# Patient Record
Sex: MALE | Race: WHITE | NOT HISPANIC OR LATINO
[De-identification: names, ages, dates, MRNs, and addresses within clinical notes are randomized per-mention and may not be internally consistent; named-entity substitution may affect disease eponyms.]

---

## 2017-01-09 ENCOUNTER — OTHER (OUTPATIENT)
Age: 67
End: 2017-01-09

## 2017-08-03 ENCOUNTER — OTHER (OUTPATIENT)
Age: 67
End: 2017-08-03

## 2018-04-18 ENCOUNTER — OTHER (OUTPATIENT)
Age: 68
End: 2018-04-18

## 2019-04-01 ENCOUNTER — OTHER (OUTPATIENT)
Age: 69
End: 2019-04-01

## 2019-04-17 ENCOUNTER — OTHER (OUTPATIENT)
Age: 69
End: 2019-04-17

## 2019-05-22 ENCOUNTER — APPOINTMENT (OUTPATIENT)
Dept: NEUROSURGERY | Facility: CLINIC | Age: 69
End: 2019-05-22
Payer: MEDICARE

## 2019-05-22 PROCEDURE — 99205 OFFICE O/P NEW HI 60 MIN: CPT

## 2019-05-24 NOTE — HISTORY OF PRESENT ILLNESS
[de-identified] : 69 yo male presents to the office for a follow up with c/o of imbalance which has been insidious over the past few months. He has \par Pt has a history of two bouts with Amnesia and was hospitalized at [facility] in 2015. At that time, the meningioma was first seen. We have been managing this lesion conservatively.\par Denies dizziness, headaches, vision, speech disturbance. He does state he does not drink water and drinks coffee all day. \par In April 2019, fu mri w wo showed the mass to be unchanged as compared to MRI in 2015.\par \par Plan: Dr. Guillermo evaluation May 28th at 530 pm\par Hematology for elevated Hematocrit\par PCP for possible dehydration

## 2019-05-24 NOTE — ASSESSMENT
[FreeTextEntry1] : May 22, 2019\par \par \par \par Re:	Gustavo Whitehead \par :	1950\par \par The patient is now a 68-year-old male who was seen by myself almost 4 years ago.  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  He was admitted to [facility].  Holter shows a questionable arrhythmia and he was diagnosed with transient global amnesia.  An MRI showed a small right CP angle meningioma and eventually he came to see me for evaluation of the meningioma, and I felt that it should be monitored completely conservatively.  \par \par He has a past medical history positive for BPH and hypercholesterolemia.  \par \par The patient has had follow-up imaging in a serial fashion every year, and there has been no change in the tumor.  His last scan was performed on May 7, 2019, and it shows no significant interval change.  I reviewed these images and once again there is a tumor that appears to be either a meningioma or a vestibular schwannoma that has some extension into the internal auditory meatus with some mild compression of the adjacent cerebellum and brainstem.  It is unchanged.  \par \par The patient remains completely neurologically intact.  He has had 3-5 months of slightly decreased balance without dizziness, and because of this he has returned to me and asked if this could be from the tumor.  Given the fact that the tumor is unchanged in size, I would doubt it was from the tumor, but we cannot completely rule it out.  I still would advocate a conservative approach for this tumor, as it has been completely stable morphologically.  I have taken the liberty of sending Mr. Whitehead to Dr. Guillermo for a full neurologic evaluation.  Of note, he has hesitancy hydrating secondary to his prostate and leaking issues.  His hematocrit is 52 and he is frequently dehydrated.  This may bear on his balance issues as well.  \par \par I do not think there is any indication for neurosurgical intervention.  I explained this clearly, and I will see the patient in one year with follow-up imaging.\par \par \par \par Jarrod Chung M.D., F.A.C.S.\par [facility]\par \par

## 2019-05-24 NOTE — PHYSICAL EXAM
[General Appearance - Alert] : alert [General Appearance - In No Acute Distress] : in no acute distress [Oriented To Time, Place, And Person] : oriented to person, place, and time [Impaired Insight] : insight and judgment were intact [Affect] : the affect was normal [Person] : oriented to person [Place] : oriented to place [Time] : oriented to time [Short Term Intact] : short term memory intact [Remote Intact] : remote memory intact [Span Intact] : the attention span was normal [Concentration Intact] : normal concentrating ability [Fluency] : fluency intact [Comprehension] : comprehension intact [Current Events] : adequate knowledge of current events [Past History] : adequate knowledge of personal past history [Vocabulary] : adequate range of vocabulary [Cranial Nerves Oculomotor (III)] : extraocular motion intact [Cranial Nerves Trigeminal (V)] : facial sensation intact symmetrically [Cranial Nerves Facial (VII)] : face symmetrical [Cranial Nerves Vestibulocochlear (VIII)] : hearing was intact bilaterally [Cranial Nerves Glossopharyngeal (IX)] : tongue and palate midline [Cranial Nerves Accessory (XI - Cranial And Spinal)] : head turning and shoulder shrug symmetric [Cranial Nerves Hypoglossal (XII)] : there was no tongue deviation with protrusion [Motor Tone] : muscle tone was normal in all four extremities [Motor Strength] : muscle strength was normal in all four extremities [No Muscle Atrophy] : normal bulk in all four extremities [Motor Handedness Right-Handed] : the patient is right hand dominant [Sensation Tactile Decrease] : light touch was intact [Balance] : balance was intact [Extraocular Movements] : extraocular movements were intact [Outer Ear] : the ears and nose were normal in appearance [Neck Appearance] : the appearance of the neck was normal [] : no respiratory distress [Respiration, Rhythm And Depth] : normal respiratory rhythm and effort [Exaggerated Use Of Accessory Muscles For Inspiration] : no accessory muscle use [Heart Rate And Rhythm] : heart rate was normal and rhythm regular [Abnormal Walk] : normal gait [Involuntary Movements] : no involuntary movements were seen [Skin Color & Pigmentation] : normal skin color and pigmentation [Skin Turgor] : normal skin turgor [Past-pointing] : there was no past-pointing [Tremor] : no tremor present

## 2019-07-22 ENCOUNTER — APPOINTMENT (OUTPATIENT)
Dept: OTOLARYNGOLOGY | Facility: CLINIC | Age: 69
End: 2019-07-22
Payer: MEDICARE

## 2019-07-22 VITALS
HEART RATE: 71 BPM | WEIGHT: 148 LBS | DIASTOLIC BLOOD PRESSURE: 92 MMHG | BODY MASS INDEX: 23.23 KG/M2 | HEIGHT: 67 IN | OXYGEN SATURATION: 98 % | SYSTOLIC BLOOD PRESSURE: 145 MMHG

## 2019-07-22 PROCEDURE — 99203 OFFICE O/P NEW LOW 30 MIN: CPT

## 2019-07-22 NOTE — ASSESSMENT
[FreeTextEntry1] : right meningioma at CPA\par +instability and hearing loss\par growing on serial MRIs since 2016- follows with dr escudero of [facility]\par \par consult today with dr rodriguez and dr owusu\par d/w patient option of surgery vs radiotherapy\par patient to consult with radiotherapy- recommended by dr owusu given patients age \par \par if patient desires surgery in future can return to office to schedule as needed

## 2019-08-06 ENCOUNTER — APPOINTMENT (OUTPATIENT)
Dept: RADIATION ONCOLOGY | Facility: CLINIC | Age: 69
End: 2019-08-06
Payer: MEDICARE

## 2019-08-06 VITALS
OXYGEN SATURATION: 98 % | DIASTOLIC BLOOD PRESSURE: 74 MMHG | SYSTOLIC BLOOD PRESSURE: 118 MMHG | RESPIRATION RATE: 18 BRPM | BODY MASS INDEX: 23.86 KG/M2 | HEART RATE: 74 BPM | WEIGHT: 152.34 LBS

## 2019-08-06 DIAGNOSIS — Z87.891 PERSONAL HISTORY OF NICOTINE DEPENDENCE: ICD-10-CM

## 2019-08-06 DIAGNOSIS — Z87.438 PERSONAL HISTORY OF OTHER DISEASES OF MALE GENITAL ORGANS: ICD-10-CM

## 2019-08-06 DIAGNOSIS — Z82.49 FAMILY HISTORY OF ISCHEMIC HEART DISEASE AND OTHER DISEASES OF THE CIRCULATORY SYSTEM: ICD-10-CM

## 2019-08-06 DIAGNOSIS — Z86.2 PERSONAL HISTORY OF DISEASES OF THE BLOOD AND BLOOD-FORMING ORGANS AND CERTAIN DISORDERS INVOLVING THE IMMUNE MECHANISM: ICD-10-CM

## 2019-08-06 PROCEDURE — 99205 OFFICE O/P NEW HI 60 MIN: CPT | Mod: 25,GC

## 2019-08-06 RX ORDER — SERTRALINE HYDROCHLORIDE 50 MG/1
50 TABLET, FILM COATED ORAL
Refills: 0 | Status: ACTIVE | COMMUNITY
Start: 2019-08-06

## 2019-08-06 NOTE — VITALS
[Maximal Pain Intensity: 0/10] : 0/10 [Least Pain Intensity: 0/10] : 0/10 [90: Able to carry normal activity; minor signs or symptoms of disease.] : 90: Able to carry normal activity; minor signs or symptoms of disease.  [90: Minor restrictions in physically strenous activity.] : 90: Minor restrictions in physically strenuous activity. [Date: ____________] : Patient's last distress assessment performed on [unfilled]. [3 - Distress Level] : Distress Level: 3

## 2019-08-06 NOTE — PHYSICAL EXAM
[General Appearance - Well Developed] : well developed [Normal] : oriented to person, place and time, the affect was normal, the mood was normal and not anxious [Oriented To Time, Place, And Person] : oriented to person, place, and time

## 2019-08-07 NOTE — HISTORY OF PRESENT ILLNESS
[FreeTextEntry1] : Gustavo Arciniega  is a 67yo male with a 5 year history of a growth alone the cerebellopontine angle likely meningioma vs schwannoma who is being evaluated for definitive RT.  \par \par Oncologic history as follows:\par 	\par 2015: After two bouts with Amnesia was hospitalized for evaluation.  Was noted to have an incidentally seen CPA angle mass.  He was recommended surveillance.  \par 2016: 1.5x1.5x1.7 cm meningioma at R. CPA\par 2555-0188:  serial MRI monitoring with gradual increase in measurements per reports (images not in our system), being managed by Dr. Chung ([facility]uro[facility])\par 4/17/19: MRI shows it now measures 2.3x1.8x2.6. Dr. Chung advocated for conservative management considering he does not feel it has changed very much in diameter. \par 7/22: Dr. Haney (ENT) saw patient and referred to Rad Onc, noting that he detected hearing loss. This right hearing loss was verified by VNG done by audiologist. Dr. Shahid discussed the patient with neurosurgery (Dr. Michaels) who said that the patient would be a better candidate for radiation considering age. He comes to us for a second opinion on radiation. \par \par Today he denies headaches. He wears bilateral hearing aids, but has noticed his hearing in the right ear has progressively gotten worse and has been associated with increasing tinnitus.  He has a sensation of the ground being tilted.  This has been worked up as described above.

## 2019-08-07 NOTE — REVIEW OF SYSTEMS
[Loss of Hearing] : loss of hearing [Dizziness] : dizziness [Negative] : Psychiatric [Fever] : no fever [Chills] : no chills [Night Sweats] : no night sweats [Fatigue] : no fatigue [Shortness Of Breath] : no shortness of breath [Wheezing] : no wheezing [Cough] : no cough [Confused] : no confusion [Fainting] : no fainting [Difficulty Walking] : no difficulty walking [FreeTextEntry4] : right sided hearing loss, worse over last 4 weeks.  [de-identified] : balance worse starting 6 months ago, now improving. Right sided hearing loss, new 4 weeks ago.

## 2019-08-07 NOTE — DISEASE MANAGEMENT
[Clinical] : TNM Stage: c [N/A] : Currently not applicable [FreeTextEntry4] : CPA mass [TTNM] : X [NTNM] : X [MTNM] : X

## 2019-08-07 NOTE — DATA REVIEWED
[FreeTextEntry1] : I have personally reviewed the series of MRI's that are available to me today either on CD or in the PACS.  There is a 2.3 cm mass which appears much larger than that at time of diagnosis which now has a large interface with the brainstem.  I suspect that this is most likely a meningioma

## 2019-08-08 ENCOUNTER — APPOINTMENT (OUTPATIENT)
Dept: NEUROSURGERY | Facility: CLINIC | Age: 69
End: 2019-08-08
Payer: MEDICARE

## 2019-08-08 VITALS
TEMPERATURE: 98.2 F | RESPIRATION RATE: 18 BRPM | HEART RATE: 100 BPM | SYSTOLIC BLOOD PRESSURE: 109 MMHG | HEIGHT: 67 IN | OXYGEN SATURATION: 94 % | WEIGHT: 147 LBS | BODY MASS INDEX: 23.07 KG/M2 | DIASTOLIC BLOOD PRESSURE: 70 MMHG

## 2019-08-08 PROCEDURE — 99215 OFFICE O/P EST HI 40 MIN: CPT

## 2019-08-12 NOTE — ASSESSMENT
[FreeTextEntry1] : 2019\par \par \par \par Re:	Gustavo Whitehead \par :	1950\par \par I last saw the patient on May 22, 2019, and he had a small CP angle mass, rule out meningioma.  I felt at that point that he had been stable and the tumor had not grown significantly, and I advised conservative treatment at least for the first year.  Subsequently, he has had many opinions, both at [facility] and [facility] and from \[facility] Dr. Wheeler, and the feeling is weighted toward treatment.  \par \par I went back and I looked at his images again with the patient, and there has been a small growth of the tumor over 2 years, about 3 mm in each direction.  It is not unreasonable for the patient to proceed with radiation or surgical removal.  I agree with Dr. Kc’s recommendation, which was given while the patient saw Dr. Haney, that radiation would be an appropriate treatment.  I told him a conservative approach is still possible, but radiation is still an absolutely appropriate treatment for this lesion.  He has lost hearing in that ear, and I told him the hearing will not return, and he is weighing his options but will most probably proceed with radiation with Dr. Wheeler. \par \par \par \par Jarrod Chnug M.D., F.A.C.S.\[facility] \[facility]

## 2019-08-12 NOTE — HISTORY OF PRESENT ILLNESS
[FreeTextEntry1] : 69 yo male presents to the office for a neurosurgical consultation for meningioma along the CPA. This mass was initially noted in 2015 and we recommended to manage conservatively. \par 2016, the meningioma measured 1.5x1.5x1.7 cm. In 2017, lesion was stable. In 2018, lesion was noted and stable.\par We saw the pt in May 2019 and it was felt at that time, the lesion was unchanged in size and we opted to continue to manage it conservatively. He was neurologically intact.\par Pt consulted with Ag Bradley, [facility] neurosurgeon, Dr. Wheeler and personal friends who are physicians for opinion on right CPA lesion. \par Dr. Chung feels there is some growth and this lesion should be radiated. \par

## 2019-08-12 NOTE — PHYSICAL EXAM
[FreeTextEntry1] : Awake, alert, and oriented x 3. VSS. In no apparent distress.   Short and long term memory intact.  Speech is clear and appropriate.  Affect is normal.  Voice is strong.  Respirations easy and even.  Normal skin color and pigmentation.  The sclera and conjunctiva normal.  Ears, nose, and neck normal in appearance.  EOMI, no nystagmus, facial sensation intact symmetrically, face symmetrical, hearing loss on right, tongue and palate midline, head turning and shoulder shrug symmetric and no tongue deviation with protrusion.  No pronator drift.   No past-pointing, no tremors noted, no dysdiadochokinesia, and finger to nose dysmetria was not present.  Romberg negative.  \par Right  hand dominant.\par \par Rises from a seated position in a comfortable fashion.\par \par Gait is well coordinated and stable without the use of an assistive device.   Able to perform tandem walk without loss of balance.   Motor strength in the upper extremities 5/5 in the biceps, triceps, and hand .  Motor strength in the lower extremities is 5/5 in the iliopsoas, quadriceps, and hamstrings.  \par \par

## 2019-08-19 ENCOUNTER — OUTPATIENT (OUTPATIENT)
Dept: OUTPATIENT SERVICES | Facility: HOSPITAL | Age: 69
LOS: 1 days | Discharge: HOME | End: 2019-08-19
Payer: MEDICARE

## 2019-08-19 DIAGNOSIS — K08.409 PARTIAL LOSS OF TEETH, UNSPECIFIED CAUSE, UNSPECIFIED CLASS: Chronic | ICD-10-CM

## 2019-09-03 PROCEDURE — 77263 THER RADIOLOGY TX PLNG CPLX: CPT

## 2019-09-06 PROCEDURE — 77338 DESIGN MLC DEVICE FOR IMRT: CPT | Mod: 26

## 2019-09-06 PROCEDURE — 77300 RADIATION THERAPY DOSE PLAN: CPT | Mod: 26

## 2019-09-06 PROCEDURE — 77301 RADIOTHERAPY DOSE PLAN IMRT: CPT | Mod: 26

## 2019-09-13 PROCEDURE — 77427 RADIATION TX MANAGEMENT X5: CPT

## 2019-09-13 PROCEDURE — 77387B: CUSTOM | Mod: 26

## 2019-09-16 PROCEDURE — 77387B: CUSTOM | Mod: 26

## 2019-09-17 PROCEDURE — 77387B: CUSTOM | Mod: 26

## 2019-09-17 NOTE — PHYSICAL EXAM
[General Appearance - Well Developed] : well developed [Normal] : normal skin color and pigmentation and no rash [Oriented To Time, Place, And Person] : oriented to person, place, and time

## 2019-09-18 PROCEDURE — 77387B: CUSTOM | Mod: 26

## 2019-09-18 NOTE — REVIEW OF SYSTEMS
[Loss of Hearing] : loss of hearing [Dizziness] : dizziness [Negative] : Psychiatric [Fever] : no fever [Night Sweats] : no night sweats [Chills] : no chills [Fatigue] : no fatigue [Wheezing] : no wheezing [Shortness Of Breath] : no shortness of breath [Cough] : no cough [Confused] : no confusion [Fainting] : no fainting [Difficulty Walking] : no difficulty walking [FreeTextEntry4] : right sided hearing loss [de-identified] : balance now slightly worse. denies headache

## 2019-09-18 NOTE — HISTORY OF PRESENT ILLNESS
[FreeTextEntry1] : Gustavo Arciniega  is a 67yo male with a 5 year history of a growth alone the cerebellopontine angle likely meningioma vs schwannoma who presented initially for evaluation for definitive RT on 8/6/19.\par \par RELEVANT MEDICAL HISTORY\par 	\par 2015: After two bouts with Amnesia was hospitalized for evaluation.  Was noted to have an incidentally seen CPA angle mass.  He was recommended surveillance.  \par 2016: 1.5x1.5x1.7 cm meningioma at R. CPA\par [id number]:  serial MRI monitoring with gradual increase in measurements per reports (images not in our system), being managed by Dr. Chung ([facility]uro[facility])\par 4/17/19: MRI shows it now measures 2.3x1.8x2.6. Dr. Chung advocated for conservative management considering he does not feel it has changed very much in diameter. \par 7/22: Dr. Haney (ENT) saw patient and referred to Rad Onc, noting that he detected hearing loss. This right hearing loss was verified by VNG done by audiologist. Dr. Shahid discussed the patient with neurosurgery (Dr. Michaels) who said that the patient would be a better candidate for radiation considering age. He comes to us for a second opinion on radiation. \par \par At the time of initial consult he denies headaches. He wears bilateral hearing aids, but has noticed his hearing in the right ear has progressively gotten worse and has been associated with increasing tinnitus.  He has a sensation of the ground being tilted.  This has been worked up as described above. \par \par 9/18/19- Mr. Whitehead presents today for on treatment visit. He has completed 540/5040 cgy of radiation to a right CP angle meningioma.\par Today he is feeling well. Notes slight decrease in balance lately. Tinnitus is maybe slightly worse. Denies headache, nausea, vomiting.

## 2019-09-18 NOTE — DISEASE MANAGEMENT
[Clinical] : TNM Stage: c [N/A] : Currently not applicable [FreeTextEntry4] : CPA mass [NTNM] : X [TTNM] : X [MTNM] : X [de-identified] : 5040 cgy [de-identified] : 540 cgy [de-identified] : right CP angle meningioma

## 2019-09-19 PROCEDURE — 77014: CPT | Mod: 26

## 2019-09-20 PROCEDURE — 77387B: CUSTOM | Mod: 26

## 2019-09-20 PROCEDURE — 77427 RADIATION TX MANAGEMENT X5: CPT

## 2019-09-23 VITALS
HEART RATE: 65 BPM | SYSTOLIC BLOOD PRESSURE: 149 MMHG | OXYGEN SATURATION: 97 % | WEIGHT: 146.38 LBS | BODY MASS INDEX: 22.93 KG/M2 | DIASTOLIC BLOOD PRESSURE: 78 MMHG | RESPIRATION RATE: 16 BRPM | TEMPERATURE: 98.2 F

## 2019-09-23 PROCEDURE — 77387B: CUSTOM | Mod: 26

## 2019-09-23 NOTE — HISTORY OF PRESENT ILLNESS
[FreeTextEntry1] : Gustavo Arciniega  is a 69yo male with a 5 year history of a growth alone the cerebellopontine angle likely meningioma vs schwannoma who presented initially for evaluation for definitive RT on 8/6/19.\par \par RELEVANT MEDICAL HISTORY\par 	\par 2015: After two bouts with Amnesia was hospitalized for evaluation.  Was noted to have an incidentally seen CPA angle mass.  He was recommended surveillance.  \par 2016: 1.5x1.5x1.7 cm meningioma at R. CPA\par [id number]:  serial MRI monitoring with gradual increase in measurements per reports (images not in our system), being managed by Dr. Chung (Neuro[facility]x)\par 4/17/19: MRI shows it now measures 2.3x1.8x2.6. Dr. Chung advocated for conservative management considering he does not feel it has changed very much in diameter. \par 7/22: Dr. Haney (ENT) saw patient and referred to Rad Onc, noting that he detected hearing loss. This right hearing loss was verified by VNG done by audiologist. Dr. Shahid discussed the patient with neurosurgery (Dr. Michaels) who said that the patient would be a better candidate for radiation considering age. He comes to us for a second opinion on radiation. \par \par At the time of initial consult he denies headaches. He wears bilateral hearing aids, but has noticed his hearing in the right ear has progressively gotten worse and has been associated with increasing tinnitus.  He has a sensation of the ground being tilted.  This has been worked up as described above. \par \par 9/18/19- Mr. Whitehead presents today for on treatment visit. He has completed 540/5040 cgy of radiation to a right CP angle meningioma.\par Today he is feeling well. Notes slight decrease in balance lately. Tinnitus is maybe slightly worse. Denies headache, nausea, vomiting. \par \par 9/23/19- Mr. Whitehead presents today for on treatment visit. Today he feels overall well. Notes slight increase in tinnitus since starting treatment. Feels his hearing has decreased. Notes slight metallic taste.

## 2019-09-23 NOTE — REVIEW OF SYSTEMS
[Loss of Hearing] : loss of hearing [Dizziness] : dizziness [Negative] : Psychiatric [Fever] : no fever [Chills] : no chills [Night Sweats] : no night sweats [Fatigue] : no fatigue [Shortness Of Breath] : no shortness of breath [Wheezing] : no wheezing [Cough] : no cough [Confused] : no confusion [Fainting] : no fainting [Difficulty Walking] : no difficulty walking [de-identified] : balance now slightly worse. denies headache [FreeTextEntry4] : right sided hearing loss. tinnitus. slight metallic taste.

## 2019-09-23 NOTE — DISEASE MANAGEMENT
[Clinical] : TNM Stage: c [N/A] : Currently not applicable [FreeTextEntry4] : CPA mass [TTNM] : X [NTNM] : X [MTNM] : X [de-identified] : 5040 cgy [de-identified] : 540 cgy [de-identified] : right CP angle meningioma

## 2019-09-24 PROCEDURE — 77387B: CUSTOM | Mod: 26

## 2019-09-25 PROCEDURE — 77387B: CUSTOM | Mod: 26

## 2019-09-26 PROCEDURE — 77014: CPT | Mod: 26

## 2019-09-27 PROCEDURE — 77387B: CUSTOM | Mod: 26

## 2019-09-30 PROCEDURE — 77387B: CUSTOM | Mod: 26

## 2019-10-01 PROCEDURE — 77427 RADIATION TX MANAGEMENT X5: CPT

## 2019-10-01 PROCEDURE — 77387B: CUSTOM | Mod: 26

## 2019-10-02 VITALS
WEIGHT: 147.93 LBS | BODY MASS INDEX: 23.17 KG/M2 | DIASTOLIC BLOOD PRESSURE: 70 MMHG | OXYGEN SATURATION: 96 % | SYSTOLIC BLOOD PRESSURE: 111 MMHG | TEMPERATURE: 98.1 F | HEART RATE: 82 BPM | RESPIRATION RATE: 18 BRPM

## 2019-10-02 PROCEDURE — 77387B: CUSTOM | Mod: 26

## 2019-10-02 NOTE — PHYSICAL EXAM
[General Appearance - Well Developed] : well developed [Oriented To Time, Place, And Person] : oriented to person, place, and time [Normal] : normal heart rate and rhythm, normal S1 and S2, and no murmurs present [Heart Sounds] : normal S1 and S2 [de-identified] : decreased hearing to right side [de-identified] : decreased hearing to right side. Cranial nerves 2-12 otherwise grossly intact

## 2019-10-02 NOTE — VITALS
[Least Pain Intensity: 0/10] : 0/10 [Maximal Pain Intensity: 0/10] : 0/10 [90: Able to carry normal activity; minor signs or symptoms of disease.] : 90: Able to carry normal activity; minor signs or symptoms of disease.  [90: Minor restrictions in physically strenous activity.] : 90: Minor restrictions in physically strenuous activity. [3 - Distress Level] : Distress Level: 3 [Date: ____________] : Patient's last distress assessment performed on [unfilled].

## 2019-10-02 NOTE — REVIEW OF SYSTEMS
[Loss of Hearing] : loss of hearing [Dizziness] : dizziness [Negative] : Psychiatric [Fever] : no fever [Chills] : no chills [Fatigue] : no fatigue [Night Sweats] : no night sweats [Shortness Of Breath] : no shortness of breath [Wheezing] : no wheezing [Confused] : no confusion [Cough] : no cough [Fainting] : no fainting [Difficulty Walking] : no difficulty walking [FreeTextEntry4] : right sided hearing loss. tinnitus, slightly improved. Taste slightly improved this week. [de-identified] : balance now slightly worse. denies headache

## 2019-10-02 NOTE — DISEASE MANAGEMENT
[Clinical] : TNM Stage: c [N/A] : Currently not applicable [FreeTextEntry4] : CPA mass [TTNM] : X [NTNM] : X [MTNM] : X [de-identified] : 5040 cgy [de-identified] : right CP angle meningioma [de-identified] : 2520 cgy

## 2019-10-02 NOTE — HISTORY OF PRESENT ILLNESS
[FreeTextEntry1] : Gustavo Arciniega  is a 69yo male with a 5 year history of a growth alone the cerebellopontine angle likely meningioma vs schwannoma who presented initially for evaluation for definitive RT on 8/6/19.\par \par RELEVANT MEDICAL HISTORY\par 	\par 2015: After two bouts with Amnesia was hospitalized for evaluation.  Was noted to have an incidentally seen CPA angle mass.  He was recommended surveillance.  \par 2016: 1.5x1.5x1.7 cm meningioma at R. CPA\par [id number]:  serial MRI monitoring with gradual increase in measurements per reports (images not in our system), being managed by Dr. Chung (Neuro[facility]x)\par 4/17/19: MRI shows it now measures 2.3x1.8x2.6. Dr. Chung advocated for conservative management considering he does not feel it has changed very much in diameter. \par 7/22: Dr. Haney (ENT) saw patient and referred to Rad Onc, noting that he detected hearing loss. This right hearing loss was verified by VNG done by audiologist. Dr. Shahid discussed the patient with neurosurgery (Dr. Michaels) who said that the patient would be a better candidate for radiation considering age. He comes to us for a second opinion on radiation. \par \par At the time of initial consult he denies headaches. He wears bilateral hearing aids, but has noticed his hearing in the right ear has progressively gotten worse and has been associated with increasing tinnitus.  He has a sensation of the ground being tilted.  This has been worked up as described above. \par \par 9/18/19- Mr. Whitehead presents today for on treatment visit. He has completed 540/5040 cgy of radiation to a right CP angle meningioma.\par Today he is feeling well. Notes slight decrease in balance lately. Tinnitus is maybe slightly worse. Denies headache, nausea, vomiting. \par \par 9/23/19- Mr. Whitehead presents today for on treatment visit. Today he feels overall well. Notes slight increase in tinnitus since starting treatment. Feels his hearing has decreased. Notes slight metallic taste. \par \par 10/2/19- Mr. Whitehead presents today for on treatment visit. He has completed 2520/5040 cgy of radiation to a right CP angle meningioma. \par Today he is feeling well. His taste sensation is slightly improved this week. He denies headaches. Notes tinnitus is slightly improved. \par Still with a sense of imbalance, which is stable from baseline prior to starting RT.

## 2019-10-03 PROCEDURE — 77014: CPT | Mod: 26

## 2019-10-04 PROCEDURE — 77427 RADIATION TX MANAGEMENT X5: CPT

## 2019-10-04 PROCEDURE — 77387B: CUSTOM | Mod: 26

## 2019-10-07 VITALS
SYSTOLIC BLOOD PRESSURE: 121 MMHG | BODY MASS INDEX: 22.98 KG/M2 | HEART RATE: 57 BPM | WEIGHT: 146.72 LBS | DIASTOLIC BLOOD PRESSURE: 72 MMHG | OXYGEN SATURATION: 93 % | TEMPERATURE: 97.2 F | RESPIRATION RATE: 17 BRPM

## 2019-10-07 PROCEDURE — 77387B: CUSTOM | Mod: 26

## 2019-10-08 VITALS
RESPIRATION RATE: 18 BRPM | OXYGEN SATURATION: 96 % | DIASTOLIC BLOOD PRESSURE: 70 MMHG | SYSTOLIC BLOOD PRESSURE: 111 MMHG | HEART RATE: 60 BPM

## 2019-10-08 VITALS — DIASTOLIC BLOOD PRESSURE: 72 MMHG | HEART RATE: 62 BPM | SYSTOLIC BLOOD PRESSURE: 115 MMHG

## 2019-10-08 PROCEDURE — 77387B: CUSTOM | Mod: 26

## 2019-10-08 NOTE — DISEASE MANAGEMENT
[FreeTextEntry4] : CPA mass [Clinical] : TNM Stage: c [TTNM] : X [NTNM] : X [MTNM] : X [N/A] : Currently not applicable [de-identified] : 3240 cgy [de-identified] : 5040 cgy [de-identified] : right CP angle meningioma

## 2019-10-08 NOTE — HISTORY OF PRESENT ILLNESS
[FreeTextEntry1] : Gustavo Arciniega  is a 69yo male with a 5 year history of a growth alone the cerebellopontine angle likely meningioma vs schwannoma who presented initially for evaluation for definitive RT on 8/6/19.\par \par RELEVANT MEDICAL HISTORY\par 	\par 2015: After two bouts with Amnesia was hospitalized for evaluation.  Was noted to have an incidentally seen CPA angle mass.  He was recommended surveillance.  \par 2016: 1.5x1.5x1.7 cm meningioma at R. CPA\par 5713-0372:  serial MRI monitoring with gradual increase in measurements per reports (images not in our system), being managed by Dr. Chung ([facility])\par 4/17/19: MRI shows it now measures 2.3x1.8x2.6. Dr. Chung advocated for conservative management considering he does not feel it has changed very much in diameter. \par 7/22: Dr. Haney (ENT) saw patient and referred to Rad Onc, noting that he detected hearing loss. This right hearing loss was verified by VNG done by audiologist. Dr. Shahid discussed the patient with neurosurgery (Dr. Michaels) who said that the patient would be a better candidate for radiation considering age. He comes to us for a second opinion on radiation. \par \par At the time of initial consult he denies headaches. He wears bilateral hearing aids, but has noticed his hearing in the right ear has progressively gotten worse and has been associated with increasing tinnitus.  He has a sensation of the ground being tilted.  This has been worked up as described above. \par \par 9/18/19- Mr. Whitehead presents today for on treatment visit. He has completed 540/5040 cgy of radiation to a right CP angle meningioma.\par Today he is feeling well. Notes slight decrease in balance lately. Tinnitus is maybe slightly worse. Denies headache, nausea, vomiting. \par \par 9/23/19- Mr. Whitehead presents today for on treatment visit. Today he feels overall well. Notes slight increase in tinnitus since starting treatment. Feels his hearing has decreased. Notes slight metallic taste. \par \par 10/2/19- Mr. Whitehead presents today for on treatment visit. He has completed 2520/5040 cgy of radiation to a right CP angle meningioma. \par Today he is feeling well. His taste sensation is slightly improved this week. He denies headaches. Notes tinnitus is slightly improved. \par Still with a sense of imbalance, which is stable from baseline prior to starting RT. \par \par 10/8/19- Mr. Whitehead presents today for on treatment visit. He notes dizziness today for the first time. Denies headaches.

## 2019-10-08 NOTE — REVIEW OF SYSTEMS
[Night Sweats] : no night sweats [Fever] : no fever [Chills] : no chills [Loss of Hearing] : loss of hearing [Fatigue] : no fatigue [Cough] : no cough [Wheezing] : no wheezing [Shortness Of Breath] : no shortness of breath [Dizziness] : dizziness [Confused] : no confusion [Fainting] : no fainting [Difficulty Walking] : no difficulty walking [Negative] : Psychiatric [de-identified] : balance now slightly worse. denies headache [FreeTextEntry4] : right sided hearing loss. tinnitus, slightly improved. Taste slightly improved this week.Notes dizziness

## 2019-10-08 NOTE — VITALS
[Maximal Pain Intensity: 0/10] : 0/10 [Least Pain Intensity: 0/10] : 0/10 [90: Minor restrictions in physically strenous activity.] : 90: Minor restrictions in physically strenuous activity. [90: Able to carry normal activity; minor signs or symptoms of disease.] : 90: Able to carry normal activity; minor signs or symptoms of disease.  [Date: ____________] : Patient's last distress assessment performed on [unfilled]. [3 - Distress Level] : Distress Level: 3

## 2019-10-09 PROCEDURE — 77387B: CUSTOM | Mod: 26

## 2019-10-10 PROCEDURE — 77014: CPT | Mod: 26

## 2019-10-11 PROCEDURE — 77427 RADIATION TX MANAGEMENT X5: CPT

## 2019-10-11 PROCEDURE — 77387B: CUSTOM | Mod: 26

## 2019-10-14 PROCEDURE — 77387B: CUSTOM | Mod: 26

## 2019-10-14 NOTE — HISTORY OF PRESENT ILLNESS
[FreeTextEntry1] : Gustavo Arciniega  is a 69yo male with a 5 year history of a growth alone the cerebellopontine angle likely meningioma vs schwannoma who presented initially for evaluation for definitive RT on 8/6/19.\par \par RELEVANT MEDICAL HISTORY\par 	\par 2015: After two bouts with Amnesia was hospitalized for evaluation.  Was noted to have an incidentally seen CPA angle mass.  He was recommended surveillance.  \par 2016: 1.5x1.5x1.7 cm meningioma at R. CPA\par [id number]:  serial MRI monitoring with gradual increase in measurements per reports (images not in our system), being managed by Dr. Chung ([facility])\par 4/17/19: MRI shows it now measures 2.3x1.8x2.6. Dr. Chung advocated for conservative management considering he does not feel it has changed very much in diameter. \par 7/22: Dr. Haney (ENT) saw patient and referred to Rad Onc, noting that he detected hearing loss. This right hearing loss was verified by VNG done by audiologist. Dr. Shahid discussed the patient with neurosurgery (Dr. Michaels) who said that the patient would be a better candidate for radiation considering age. He comes to us for a second opinion on radiation. \par \par At the time of initial consult he denies headaches. He wears bilateral hearing aids, but has noticed his hearing in the right ear has progressively gotten worse and has been associated with increasing tinnitus.  He has a sensation of the ground being tilted.  This has been worked up as described above. \par \par 9/18/19- Mr. Whitehead presents today for on treatment visit. He has completed 540/5040 cgy of radiation to a right CP angle meningioma.\par Today he is feeling well. Notes slight decrease in balance lately. Tinnitus is maybe slightly worse. Denies headache, nausea, vomiting. \par \par 9/23/19- Mr. Whitehead presents today for on treatment visit. Today he feels overall well. Notes slight increase in tinnitus since starting treatment. Feels his hearing has decreased. Notes slight metallic taste. \par \par 10/2/19- Mr. Whitehead presents today for on treatment visit. He has completed 2520/5040 cgy of radiation to a right CP angle meningioma. \par Today he is feeling well. His taste sensation is slightly improved this week. He denies headaches. Notes tinnitus is slightly improved. \par Still with a sense of imbalance, which is stable from baseline prior to starting RT. \par \par 10/8/19- Mr. Whitehead presents today for on treatment visit. He notes dizziness today for the first time. Denies headaches. \par \par 10/14/19- mr. Whitehead presents today for on treatment visit. He has completed 3960/5040 cgy of radiation to the right CP angle. Feeling well. No further episodes of dizziness. Denies nausea, vomiting. Still with decreased appetite. Stomach slightly upset.

## 2019-10-15 PROCEDURE — 77387B: CUSTOM | Mod: 26

## 2019-10-16 PROCEDURE — 77387B: CUSTOM | Mod: 26

## 2019-10-17 PROCEDURE — 77014: CPT | Mod: 26

## 2019-10-18 PROCEDURE — 77427 RADIATION TX MANAGEMENT X5: CPT

## 2019-10-18 PROCEDURE — 77387B: CUSTOM | Mod: 26

## 2019-10-21 VITALS
OXYGEN SATURATION: 97 % | HEART RATE: 57 BPM | TEMPERATURE: 97.9 F | RESPIRATION RATE: 17 BRPM | BODY MASS INDEX: 22.79 KG/M2 | DIASTOLIC BLOOD PRESSURE: 64 MMHG | WEIGHT: 145.5 LBS | SYSTOLIC BLOOD PRESSURE: 124 MMHG

## 2019-10-21 PROCEDURE — 77387B: CUSTOM | Mod: 26

## 2019-10-21 NOTE — REVIEW OF SYSTEMS
[Chills] : no chills [Fever] : no fever [Fatigue] : no fatigue [Night Sweats] : no night sweats [Cough] : no cough [Wheezing] : no wheezing [Shortness Of Breath] : no shortness of breath [Confused] : no confusion [Fainting] : no fainting [Difficulty Walking] : no difficulty walking [FreeTextEntry4] : right sided hearing loss. tinnitus, slightly improved. Taste is changed [de-identified] : sam denies headache [Loss of Hearing] : loss of hearing [Dizziness] : dizziness [Negative] : Psychiatric [Nausea: Grade 1 - Loss of appetite without alteration in eating habits] : Nausea: Grade 1 - Loss of appetite without alteration in eating habits [Fatigue: Grade 1 - Fatigue relieved by rest] : Fatigue: Grade 1 - Fatigue relieved by rest [Dysgeusia: Grade 1- Altered taste but no change in diet] : Dysgeusia: Grade 1 - Altered taste but no change in diet

## 2019-10-21 NOTE — PHYSICAL EXAM
4 yo female with c/o abdominal pain for one month, no hx of vomiting, no diarrhea, did have small streak of ? blood in stools today, decrease in solid intake, no dysuria no frequency, no hematuria, immunizations ud  Physical exam: awake alert, nc emely, lungs clear, cardiac exam wnl, abdomen very soft nd nt no hsm no masses, no pain on palpation, cap refill less than 2 seconds  Impression: 4 yo female with hx of abdominal pain for about one month, referred by PMD for abdominal US and AXR  Keshia Viveros MD [de-identified] : decreased hearing to right side. Cranial nerves 2-12 otherwise grossly intact.  [de-identified] : decreased hearing to right side [General Appearance - Well Developed] : well developed [Normal] : oriented to person, place and time, the affect was normal, the mood was normal and not anxious [Oriented To Time, Place, And Person] : oriented to person, place, and time Full abdominal ultrasound was normal. UA normal. CMP unremarkable. CBC unremarkable. Abdominal xray showed some stool burden. Will do fleet enema and reevaluate.  -melhallal PGY2

## 2019-10-21 NOTE — DISEASE MANAGEMENT
[FreeTextEntry4] : CPA mass [TTNM] : X [NTNM] : X [MTNM] : X [de-identified] : right CP angle meningioma [de-identified] : 9999 1450 cgy [de-identified] : 5040 cgy [Clinical] : TNM Stage: c [N/A] : Currently not applicable

## 2019-10-21 NOTE — VITALS
[Maximal Pain Intensity: 0/10] : 0/10 [90: Able to carry normal activity; minor signs or symptoms of disease.] : 90: Able to carry normal activity; minor signs or symptoms of disease.  [Least Pain Intensity: 0/10] : 0/10 [90: Minor restrictions in physically strenous activity.] : 90: Minor restrictions in physically strenuous activity. [Date: ____________] : Patient's last distress assessment performed on [unfilled]. [3 - Distress Level] : Distress Level: 3

## 2019-10-22 PROCEDURE — 77387B: CUSTOM | Mod: 26

## 2019-10-22 NOTE — DISEASE MANAGEMENT
[FreeTextEntry4] : CPA mass [NTNM] : X [TTNM] : X [de-identified] : 7790 9415 cgy [MTNM] : X [de-identified] : right CP angle meningioma [de-identified] : 5040 cgy

## 2019-10-22 NOTE — REVIEW OF SYSTEMS
[Night Sweats] : no night sweats [Fever] : no fever [Chills] : no chills [Fatigue] : no fatigue [Shortness Of Breath] : no shortness of breath [Wheezing] : no wheezing [Confused] : no confusion [Cough] : no cough [FreeTextEntry4] : right sided hearing loss. tinnitus, slightly improved. Taste is changed [Fainting] : no fainting [Difficulty Walking] : no difficulty walking [de-identified] : sam denies headache

## 2019-10-22 NOTE — PHYSICAL EXAM
[de-identified] : decreased hearing to right side. Cranial nerves 2-12 otherwise grossly intact.  [de-identified] : decreased hearing to right side

## 2019-12-17 NOTE — VITALS
[Least Pain Intensity: 0/10] : 0/10 [Maximal Pain Intensity: 0/10] : 0/10 [90: Able to carry normal activity; minor signs or symptoms of disease.] : 90: Able to carry normal activity; minor signs or symptoms of disease.  [90: Minor restrictions in physically strenous activity.] : 90: Minor restrictions in physically strenuous activity. [Date: ____________] : Patient's last distress assessment performed on [unfilled]. [3 - Distress Level] : Distress Level: 3

## 2019-12-18 ENCOUNTER — APPOINTMENT (OUTPATIENT)
Dept: RADIATION ONCOLOGY | Facility: CLINIC | Age: 69
End: 2019-12-18
Payer: MEDICARE

## 2019-12-18 VITALS
SYSTOLIC BLOOD PRESSURE: 157 MMHG | TEMPERATURE: 98 F | OXYGEN SATURATION: 99 % | BODY MASS INDEX: 23.3 KG/M2 | RESPIRATION RATE: 17 BRPM | HEART RATE: 59 BPM | HEIGHT: 67 IN | WEIGHT: 148.48 LBS | DIASTOLIC BLOOD PRESSURE: 90 MMHG

## 2019-12-18 PROCEDURE — 99024 POSTOP FOLLOW-UP VISIT: CPT

## 2019-12-18 NOTE — HISTORY OF PRESENT ILLNESS
[FreeTextEntry1] : Gustavo Arciniega  is a 67yo male with a 5 year history of a growth alone the cerebellopontine angle likely meningioma vs schwannoma who presented initially for evaluation for definitive RT on 8/6/19. He completed radiation therapy for a total of 5040 cgy from 9/13/19-10/22/19 over 28 fractions.\par \par RELEVANT MEDICAL HISTORY\par 	\par 2015: After two bouts with Amnesia was hospitalized for evaluation.  Was noted to have an incidentally seen CPA angle mass.  He was recommended surveillance.  \par 2016: 1.5x1.5x1.7 cm meningioma at R. CPA\par [id number]:  serial MRI monitoring with gradual increase in measurements per reports (images not in our system), being managed by Dr. Chung ([facility])\par 4/17/19: MRI shows it now measures 2.3x1.8x2.6. Dr. Chung advocated for conservative management considering he does not feel it has changed very much in diameter. \par 7/22: Dr. Haney (ENT) saw patient and referred to Rad Onc, noting that he detected hearing loss. This right hearing loss was verified by VNG done by audiologist. Dr. Shahid discussed the patient with neurosurgery (Dr. Michaels) who said that the patient would be a better candidate for radiation considering age. He comes to us for a second opinion on radiation. \par \par At the time of initial consult he denies headaches. He wears bilateral hearing aids, but has noticed his hearing in the right ear has progressively gotten worse and has been associated with increasing tinnitus.  He has a sensation of the ground being tilted.  This has been worked up as described above. \par \par 12/18/19- Mr. Whitehead presents today for post treatment evaluation. Today he notes extreme fatigue. His balance remains off slightly. He notes a left sided headache over the past three weeks. His stomach is a bit "off," but no nausea or vomiting. No facial weakness or numbness, Remains with foul taste in his mouth. Tinnitus is improved.Extremely fatigued and feels his mental capacity is reduced.\par  Never smoker

## 2019-12-18 NOTE — PHYSICAL EXAM
[General Appearance - Well Developed] : well developed [Oriented To Time, Place, And Person] : oriented to person, place, and time [Normal] : normal heart rate and rhythm, normal S1 and S2, and no murmurs present [de-identified] : decreased hearing to right side. Cranial nerves 2-12 otherwise grossly intact. Cerebellar exam abnormal for heal to toe walking.  Remainder of exam WNL [de-identified] : decreased hearing to right side

## 2019-12-18 NOTE — REVIEW OF SYSTEMS
[Loss of Hearing] : loss of hearing [Dizziness] : dizziness [Nausea: Grade 1 - Loss of appetite without alteration in eating habits] : Nausea: Grade 1 - Loss of appetite without alteration in eating habits [Fatigue: Grade 1 - Fatigue relieved by rest] : Fatigue: Grade 1 - Fatigue relieved by rest [Dysgeusia: Grade 1- Altered taste but no change in diet] : Dysgeusia: Grade 1 - Altered taste but no change in diet [Negative] : Gastrointestinal [Chills] : no chills [Fever] : no fever [Fatigue] : no fatigue [Night Sweats] : no night sweats [Wheezing] : no wheezing [Shortness Of Breath] : no shortness of breath [Cough] : no cough [Confused] : no confusion [Fainting] : no fainting [Difficulty Walking] : no difficulty walking [FreeTextEntry7] : early satiety, increased gas [FreeTextEntry4] : tinnitus maybe slightly improved. with foul taste in his mouth at most times [de-identified] : notes headache to the left side of head, starting in the back, radiating forward to eye, 80% of the time, 3-4/10. no further vertigo

## 2019-12-19 ENCOUNTER — FORM ENCOUNTER (OUTPATIENT)
Age: 69
End: 2019-12-19

## 2019-12-20 ENCOUNTER — OUTPATIENT (OUTPATIENT)
Dept: OUTPATIENT SERVICES | Facility: HOSPITAL | Age: 69
LOS: 1 days | End: 2019-12-20
Payer: MEDICARE

## 2019-12-20 ENCOUNTER — APPOINTMENT (OUTPATIENT)
Dept: MRI IMAGING | Facility: CLINIC | Age: 69
End: 2019-12-20
Payer: MEDICARE

## 2019-12-20 DIAGNOSIS — K08.409 PARTIAL LOSS OF TEETH, UNSPECIFIED CAUSE, UNSPECIFIED CLASS: Chronic | ICD-10-CM

## 2019-12-20 DIAGNOSIS — D32.9 BENIGN NEOPLASM OF MENINGES, UNSPECIFIED: ICD-10-CM

## 2019-12-20 PROCEDURE — 70553 MRI BRAIN STEM W/O & W/DYE: CPT | Mod: 26

## 2019-12-20 PROCEDURE — 70553 MRI BRAIN STEM W/O & W/DYE: CPT

## 2019-12-20 PROCEDURE — A9585: CPT

## 2020-01-08 ENCOUNTER — APPOINTMENT (OUTPATIENT)
Dept: RADIATION ONCOLOGY | Facility: CLINIC | Age: 70
End: 2020-01-08
Payer: MEDICARE

## 2020-01-08 VITALS
DIASTOLIC BLOOD PRESSURE: 73 MMHG | HEART RATE: 64 BPM | RESPIRATION RATE: 17 BRPM | TEMPERATURE: 98 F | HEIGHT: 67 IN | WEIGHT: 148.59 LBS | OXYGEN SATURATION: 97 % | SYSTOLIC BLOOD PRESSURE: 127 MMHG | BODY MASS INDEX: 23.32 KG/M2

## 2020-01-08 PROCEDURE — 99214 OFFICE O/P EST MOD 30 MIN: CPT

## 2020-01-10 RX ORDER — DEXAMETHASONE 4 MG/1
4 TABLET ORAL
Qty: 30 | Refills: 0 | Status: COMPLETED | COMMUNITY
Start: 2019-12-18 | End: 2020-01-10

## 2020-01-10 RX ORDER — KRILL/OM-3/DHA/EPA/PHOSPHO/AST 1000-230MG
81 CAPSULE ORAL
Refills: 0 | Status: COMPLETED | COMMUNITY
Start: 2019-08-06 | End: 2020-01-10

## 2020-01-11 NOTE — PHYSICAL EXAM
[General Appearance - Well Developed] : well developed [Normal] : oriented to person, place and time, the affect was normal, the mood was normal and not anxious [Oriented To Time, Place, And Person] : oriented to person, place, and time [de-identified] : decreased hearing to right side [de-identified] : decreased hearing to right side. Cranial nerves 2-12 otherwise grossly intact. Cerebellar exam abnormal for heal to toe walking.  Remainder of exam WNL

## 2020-01-11 NOTE — HISTORY OF PRESENT ILLNESS
[FreeTextEntry1] : Gustavo Arciniega  is a 70 y/o male with a 5 year history of a growth alone the cerebellopontine angle likely meningioma vs schwannoma who presented initially for evaluation for definitive RT on 8/6/19. He completed radiation therapy for a total of 5040 cgy from 9/13/19-10/22/19 over 28 fractions.\par \par RELEVANT MEDICAL HISTORY\par 	\par 2015: After two bouts with Amnesia was hospitalized for evaluation.  Was noted to have an incidentally seen CPA angle mass.  He was recommended surveillance.  \par 2016: 1.5x1.5x1.7 cm meningioma at R. CPA\par [id number]:  serial MRI monitoring with gradual increase in measurements per reports (images not in our system), being managed by Dr. Chung ([facility])\par 4/17/19: MRI shows it now measures 2.3x1.8x2.6. Dr. Chung advocated for conservative management considering he does not feel it has changed very much in diameter. \par 7/22: Dr. Haney (ENT) saw patient and referred to Rad Onc, noting that he detected hearing loss. This right hearing loss was verified by VNG done by audiologist. Dr. Shahid discussed the patient with neurosurgery (Dr. Michaels) who said that the patient would be a better candidate for radiation considering age. He comes to us for a second opinion on radiation. \par \par At the time of initial consult he denies headaches. He wears bilateral hearing aids, but has noticed his hearing in the right ear has progressively gotten worse and has been associated with increasing tinnitus.  He has a sensation of the ground being tilted.  This has been worked up as described above. \par \par 12/18/19- Mr. Whitehead presents today for post treatment evaluation. Today he notes extreme fatigue. His balance remains off slightly. He notes a left sided headache over the past three weeks. His stomach is a bit "off," but no nausea or vomiting. No facial weakness or numbness, Remains with foul taste in his mouth. Tinnitus is improved.Extremely fatigued and feels his mental capacity is reduced.\par \par 1/8/19- Mr. Whitehead presents today for follow up. He underwent a brain MRI on 12/20/19 which showed no significant change. \par He called on 12/31/19. He noted tremors and disrupted sleep on 12/31, was advised to stop scopolamine patch. He has noticed fatigue and "mental fog" since November without resolution of symptoms. He has a history of 2 previous episodes of transient global amnesia and has polycythemia vera which led to possible ischemic events in the past. Mr. Whitehead also notices his energy level to have decreased over the last two months. He has a slightly evolving iron deficiency over the last 3 months and is following up with his heme-onc tomorrow. Taste has improved. \par

## 2020-01-11 NOTE — REVIEW OF SYSTEMS
[Negative] : Psychiatric [Nausea: Grade 1 - Loss of appetite without alteration in eating habits] : Nausea: Grade 1 - Loss of appetite without alteration in eating habits [Fatigue: Grade 1 - Fatigue relieved by rest] : Fatigue: Grade 1 - Fatigue relieved by rest [Dysgeusia: Grade 1- Altered taste but no change in diet] : Dysgeusia: Grade 1 - Altered taste but no change in diet [Fatigue] : fatigue [Loss of Hearing] : loss of hearing [Fever] : no fever [Chills] : no chills [Night Sweats] : no night sweats [Shortness Of Breath] : no shortness of breath [Wheezing] : no wheezing [Cough] : no cough [Confused] : no confusion [Dizziness] : no dizziness [Fainting] : no fainting [Difficulty Walking] : no difficulty walking [FreeTextEntry7] : early satiety, increased gas [FreeTextEntry4] : taste has improved

## 2020-01-21 ENCOUNTER — APPOINTMENT (OUTPATIENT)
Dept: NEUROLOGY | Facility: CLINIC | Age: 70
End: 2020-01-21
Payer: MEDICARE

## 2020-01-21 VITALS
SYSTOLIC BLOOD PRESSURE: 122 MMHG | HEART RATE: 62 BPM | OXYGEN SATURATION: 97 % | RESPIRATION RATE: 18 BRPM | DIASTOLIC BLOOD PRESSURE: 72 MMHG | TEMPERATURE: 97.8 F

## 2020-01-21 DIAGNOSIS — H91.93 UNSPECIFIED HEARING LOSS, BILATERAL: ICD-10-CM

## 2020-01-21 DIAGNOSIS — Z97.4 PRESENCE OF EXTERNAL HEARING-AID: ICD-10-CM

## 2020-01-21 DIAGNOSIS — Z80.1 FAMILY HISTORY OF MALIGNANT NEOPLASM OF TRACHEA, BRONCHUS AND LUNG: ICD-10-CM

## 2020-01-21 DIAGNOSIS — Z81.8 FAMILY HISTORY OF OTHER MENTAL AND BEHAVIORAL DISORDERS: ICD-10-CM

## 2020-01-21 PROCEDURE — 99205 OFFICE O/P NEW HI 60 MIN: CPT

## 2020-01-21 RX ORDER — ASPIRIN ENTERIC COATED TABLETS 81 MG 81 MG/1
81 TABLET, DELAYED RELEASE ORAL DAILY
Qty: 30 | Refills: 0 | Status: ACTIVE | COMMUNITY
Start: 2020-01-21

## 2020-01-21 NOTE — PHYSICAL EXAM
[FreeTextEntry1] : On exam, he is awake and alert - oriented and fluent.\par PERRL, EOMI, VFF\par Bilateral hearing reduced to soft sound\par Slight flattening of the right NLF\par Tongue protrudes midline\par Uvula is midline.\par Tone is normal and without cogwheeling bilaterally\par He has a fine extension tremor more notable on the right - no resting tremor.\par Strength is full in all 4 limbs.\par Reflexes are brisk diffusely - left toe down, right equivocal.\par Vibration and proprioception are intact bilaterally.\par He ambulates independently - symmetric arm swing and normal turns.

## 2020-01-21 NOTE — DISCUSSION/SUMMARY
[FreeTextEntry1] : In summary, this is a 70 yo man s/p RT for a right CPA presumed meningioma - treatment complicated by headache, metallic taste, and fatigue. Symptoms have improved significantly, although not completely with decadron which is now about to be re-tapered.\par Tremor - likely related to steroids - will follow. Suspect many symptoms treatment related - followed up in 1 month. Blood work and hematology evaluation requested.\par

## 2020-01-21 NOTE — HISTORY OF PRESENT ILLNESS
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 The first occurred in January, 2015 - he was evaluated at [facility] and again in November, 2015. He was evaluated by a neurologist  - Dr. Uma Deal:\par \par \par MRI brain 1/31/15: Right CPA homogenously enhancing mass measuring 1.3 x 0.8 x 1.1 cm with enhancing dural tail.\par MRA brain 1/31/15: without hemodynamically significant stenosis.\par 72 hour ambulatory EEG was normal\par EEG 2/1/5 normal.\par \par CPA angle mass was felt to be an incidental meningioma and was being monitored by Dr. Chung with interval scans. Between 2015 and 2019, there has been a gradual increased in size. He has developed bilateral hearing loss - right more than left (verified by VNG). He has also seen Dr. Shahid (ENT) and Dr. Kc who did not recommend surgery and referred the patient to Dr. Wheeler in August, 2019 for consideration for radiation. \par He completed radiation therapy in October and received a total of 5040 cGy from 9/13 - 10/22/19 over 28 fractions.\par \par On his follow up appointment with Dr. Wheeler on January 8, he complained of increased fatigue (which had begun during treatment and had reportedly increased since stopping). He also noted left sided headache, metallic taste with poor appetite, and reduced mental sharpness.\par \par His most recent MRI brain, post-treatment on 12/20/19 - showed a stable right CPA mass with stable mass effect on the brainstem.\par \par PMH also notable for polycythemia - had been having monthly phlebotomy for past 6 months and BPH.\par \par FH is notable for both parents passed in his 70's from cardiac disease. One sister passed from lung ca - had a long smoking history, one sister has Alzheimer's. He has 2 sons, one is obese per description.\par \par Patient works as a psychologist  - educational / placement consultant. He is .\par \par Since restarting steroids, his headache resolved immediately. He had been experiencing a metallic taste in his mouth which has been improving. He no longer has a sense of overwhelming fatigue - in fact he feels that the steroids are interrupting his sleep and that he is more tired but also more alert. He notes a tremor in his hands. He still feels that he is occasionally not as sharp mentally and that when he arises he has a sense of being tilted towards the left. He has had no falls.\par \par \par

## 2020-02-27 ENCOUNTER — APPOINTMENT (OUTPATIENT)
Dept: NEUROLOGY | Facility: CLINIC | Age: 70
End: 2020-02-27
Payer: MEDICARE

## 2020-02-27 VITALS
SYSTOLIC BLOOD PRESSURE: 118 MMHG | HEART RATE: 62 BPM | DIASTOLIC BLOOD PRESSURE: 76 MMHG | OXYGEN SATURATION: 97 % | HEIGHT: 67 IN | RESPIRATION RATE: 18 BRPM

## 2020-02-27 DIAGNOSIS — Z00.00 ENCOUNTER FOR GENERAL ADULT MEDICAL EXAMINATION W/OUT ABNORMAL FINDINGS: ICD-10-CM

## 2020-02-27 PROCEDURE — 99215 OFFICE O/P EST HI 40 MIN: CPT

## 2020-02-27 NOTE — HISTORY OF PRESENT ILLNESS
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 The first occurred in January, 2015 - he was evaluated at [facility] and again in November, 2015. He was evaluated by a neurologist - Dr. Uma Deal:\par \par \par MRI brain 1/31/15: Right CPA homogenously enhancing mass measuring 1.3 x 0.8 x 1.1 cm with enhancing dural tail.\par MRA brain 1/31/15: without hemodynamically significant stenosis.\par 72 hour ambulatory EEG was normal\par EEG 2/1/5 normal.\par \par CPA angle mass was felt to be an incidental meningioma and was being monitored by Dr. Chung with interval scans. Between 2015 and 2019, there has been a gradual increased in size. He has developed bilateral hearing loss - right more than left (verified by VNG). He has also seen Dr. hSahid (ENT) and Dr. Kc who did not recommend surgery and referred the patient to Dr. Wheeler in August, 2019 for consideration for radiation. \par He completed radiation therapy in October and received a total of 5040 cGy from 9/13 - 10/22/19 over 28 fractions.\par \par On his follow up appointment with Dr. Wheeler on January 8, he complained of increased fatigue (which had begun during treatment and had reportedly increased since stopping). He also noted left sided headache, metallic taste with poor appetite, and reduced mental sharpness.\par \par His most recent MRI brain, post-treatment on 12/20/19 - showed a stable right CPA mass with stable mass effect on the brainstem.\par Since restarting steroids, his headache resolved immediately. He had been experiencing a metallic taste in his mouth which has been improving. He no longer has a sense of overwhelming fatigue - in fact he feels that the steroids are interrupting his sleep and that he is more tired but also more alert. He notes a tremor in his hands. He still feels that he is occasionally not as sharp mentally and that when he arises he has a sense of being tilted towards the left. He has had no falls. Recommended balance therapy\par 2/27/2020- Patient here for a follow up. Reports that he feels " his sense of feeling in a ship" has improved. It was essentially gone while on steroids but the metallic taste and dizzy feeling came back but not worse since stopping steroids.\par

## 2020-02-27 NOTE — PHYSICAL EXAM
[FreeTextEntry1] : Patient seen and examined\par Alert, awake , Oriented X 3. Speech clear and fluent\par PERRLA, EOMI, VFF\par Face symmetrical. Tongue protrudes midline - oral thrush noted\par PAIGE x 4 with good and equal strength\par FFM, coordination equal bilaterally\par Sensation intact bilaterally\par Gait steady. Ambulating independently\par

## 2020-02-27 NOTE — DISCUSSION/SUMMARY
[FreeTextEntry1] : Patient seen and examined.\par Clinically stable.\par Thrush - Will order Nystatin suspension.\par Will repeat MRI along with a follow up in June, 2020.\par Will do a clinical follow up in a month.\par In the interim, if any questions patient knows to call me.

## 2020-03-26 ENCOUNTER — APPOINTMENT (OUTPATIENT)
Dept: NEUROLOGY | Facility: CLINIC | Age: 70
End: 2020-03-26

## 2020-06-09 ENCOUNTER — APPOINTMENT (OUTPATIENT)
Dept: MRI IMAGING | Facility: IMAGING CENTER | Age: 70
End: 2020-06-09
Payer: MEDICARE

## 2020-06-09 ENCOUNTER — RESULT REVIEW (OUTPATIENT)
Age: 70
End: 2020-06-09

## 2020-06-09 ENCOUNTER — OUTPATIENT (OUTPATIENT)
Dept: OUTPATIENT SERVICES | Facility: HOSPITAL | Age: 70
LOS: 1 days | End: 2020-06-09
Payer: MEDICARE

## 2020-06-09 DIAGNOSIS — K08.409 PARTIAL LOSS OF TEETH, UNSPECIFIED CAUSE, UNSPECIFIED CLASS: Chronic | ICD-10-CM

## 2020-06-09 DIAGNOSIS — D32.9 BENIGN NEOPLASM OF MENINGES, UNSPECIFIED: ICD-10-CM

## 2020-06-09 PROCEDURE — 70553 MRI BRAIN STEM W/O & W/DYE: CPT

## 2020-06-09 PROCEDURE — 70553 MRI BRAIN STEM W/O & W/DYE: CPT | Mod: 26

## 2020-06-09 PROCEDURE — A9585: CPT

## 2020-06-10 ENCOUNTER — APPOINTMENT (OUTPATIENT)
Dept: NEUROLOGY | Facility: CLINIC | Age: 70
End: 2020-06-10
Payer: MEDICARE

## 2020-06-10 PROCEDURE — 99214 OFFICE O/P EST MOD 30 MIN: CPT | Mod: 95

## 2020-06-10 NOTE — PHYSICAL EXAM
[FreeTextEntry1] : On exam, he is awake and alert - oriented and fluent.\par Slight flattening of the right NLF\par He has a fine extension tremor more notable on the right - no resting tremor.\par Strength is full in all 4 limbs.\par He ambulates independently - symmetric arm swing and normal turns.

## 2020-06-10 NOTE — DISCUSSION/SUMMARY
[FreeTextEntry1] : I have personally reviewed his MRI from June 9 and have compared it to his prior exam dated 12/20/19 - the enhancing lesion in his right CPA is unchanged.\par \par He is clinically better.\par \par He will return in 6 months for follow up with a repeat MRI scan - if any new issues in the interim, he knows to call me.

## 2020-06-10 NOTE — DATA REVIEWED
[de-identified] : 6/9/2020 -\par Similar appearing, avidly enhancing, isointense to gray matter 1.9 x 1.9 x \par 2.1 cm extra-axial right cerebellopontine angle mass with similar extension \par into the anterior aspect of the right IAC. Differential diagnosis includes \par meningioma (favored) and vestibular schwannoma. \par \par Similar mass effect upon the basis pontis and right brachium pontis. No \par underlying vasogenic edema. \par \par No abnormal parenchymal or leptomeningeal enhancement. \par \par MR perfusion of the right cerebellopontine angle lesion is consistent with \par the presence of neoplasm. \par

## 2020-06-10 NOTE — HISTORY OF PRESENT ILLNESS
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 The first occurred in January, 2015 - he was evaluated at [facility] and again in November, 2015. He was evaluated by a neurologist - Dr. Uma Deal:\par \par \par MRI brain 1/31/15: Right CPA homogenously enhancing mass measuring 1.3 x 0.8 x 1.1 cm with enhancing dural tail.\par MRA brain 1/31/15: without hemodynamically significant stenosis.\par 72 hour ambulatory EEG was normal\par EEG 2/1/5 normal.\par \par CPA angle mass was felt to be an incidental meningioma and was being monitored by Dr. Chung with interval scans. Between 2015 and 2019, there has been a gradual increased in size. He has developed bilateral hearing loss - right more than left (verified by VNG). He has also seen Dr. Shahid (ENT) and Dr. Kc who did not recommend surgery and referred the patient to Dr. Wheeler in August, 2019 for consideration for radiation. \par He completed radiation therapy in October and received a total of 5040 cGy from 9/13 - 10/22/19 over 28 fractions.\par \par On his follow up appointment with Dr. Wheeler on January 8, he complained of increased fatigue (which had begun during treatment and had reportedly increased since stopping). He also noted left sided headache, metallic taste with poor appetite, and reduced mental sharpness.\par \par His most recent MRI brain, post-treatment on 12/20/19 - showed a stable right CPA mass with stable mass effect on the brainstem.\par Since restarting steroids, his headache resolved immediately. He had been experiencing a metallic taste in his mouth which has been improving. He no longer has a sense of overwhelming fatigue - in fact he feels that the steroids are interrupting his sleep and that he is more tired but also more alert. He notes a tremor in his hands. He still feels that he is occasionally not as sharp mentally and that when he arises he has a sense of being tilted towards the left. He has had no falls. Recommended balance therapy\par 2/27/2020- Clinically stable. Plan was made to repeat MRI in June - he reports an improvement balance and his energy - he no longer feels that he is walking "on a ship."  He is less fatigued - his taste has improved although still not completely back to baseline.

## 2020-07-08 ENCOUNTER — APPOINTMENT (OUTPATIENT)
Dept: RADIATION ONCOLOGY | Facility: CLINIC | Age: 70
End: 2020-07-08
Payer: MEDICARE

## 2020-07-08 PROCEDURE — 99213 OFFICE O/P EST LOW 20 MIN: CPT | Mod: 95

## 2020-07-08 RX ORDER — DEXAMETHASONE 4 MG/1
4 TABLET ORAL
Qty: 40 | Refills: 0 | Status: DISCONTINUED | COMMUNITY
Start: 2020-01-08 | End: 2020-07-08

## 2020-07-09 NOTE — HISTORY OF PRESENT ILLNESS
[Home] : at home, [unfilled] , at the time of the visit. [Verbal consent obtained from patient] : the patient, [unfilled] [Medical Office: ([facility])___] : at the medical office located in  [FreeTextEntry1] : Gustavo Arciniega  is a 70 y/o male with a 5 year history of a growth along the cerebellopontine angle likely meningioma vs schwannoma who presented initially for evaluation for definitive RT on 8/6/19. He completed radiation therapy for a total of 5040 cgy from 9/13/19-10/22/19 over 28 fractions.\par \par RELEVANT MEDICAL HISTORY\par 	\par 2015: After two bouts with Amnesia was hospitalized for evaluation.  Was noted to have an incidentally seen CPA angle mass.  He was recommended surveillance.  \par 2016: 1.5x1.5x1.7 cm meningioma at R. CPA\par [id number]:  serial MRI monitoring with gradual increase in measurements per reports (images not in our system), being managed by Dr. Chung ([facility])\par 4/17/19: MRI shows it now measures 2.3x1.8x2.6. Dr. Chung advocated for conservative management considering he does not feel it has changed very much in diameter. \par 7/22: Dr. Haney (ENT) saw patient and referred to Rad Onc, noting that he detected hearing loss. This right hearing loss was verified by VNG done by audiologist. Dr. Shahid discussed the patient with neurosurgery (Dr. Michaels) who said that the patient would be a better candidate for radiation considering age. He comes to us for a second opinion on radiation. \par \par At the time of initial consult he denies headaches. He wears bilateral hearing aids, but has noticed his hearing in the right ear has progressively gotten worse and has been associated with increasing tinnitus.  He has a sensation of the ground being tilted.  This has been worked up as described above. \par \par 12/18/19- Mr. Whitehead presents today for post treatment evaluation. Today he notes extreme fatigue. His balance remains off slightly. He notes a left sided headache over the past three weeks. His stomach is a bit "off," but no nausea or vomiting. No facial weakness or numbness, Remains with foul taste in his mouth. Tinnitus is improved.Extremely fatigued and feels his mental capacity is reduced.\par \par 1/8/19- Mr. Whitehead presents today for follow up. He underwent a brain MRI on 12/20/19 which showed no significant change. \par He called on 12/31/19. He noted tremors and disrupted sleep on 12/31, was advised to stop scopolamine patch. He has noticed fatigue and "mental fog" since November without resolution of symptoms. He has a history of 2 previous episodes of transient global amnesia and has polycythemia vera which led to possible ischemic events in the past. Mr. Whitehead also notices his energy level to have decreased over the last two months. He has a slightly evolving iron deficiency over the last 3 months and is following up with his heme-onc tomorrow. Taste has improved. \par \par \par 7/8/2020- Mr. Whitehead presents today for follow up. Has continued to follow with Dr Alatorre. He provides verbal consent for his TELEHEALTH visit on 7/8/2020 at 2:22 PM. \par \par MRI brain 7/2020 showed Similar appearing, avidly enhancing, isointense to gray matter 1.9 x 1.9 x 2.1 cm extra-axial right cerebellopontine angle mass with similar extension into the anterior aspect of the right IAC.  Similar mass effect upon the basis pontis and right brachium pontis. No underlying vasogenic edema. No abnormal parenchymal or leptomeningeal enhancement. \par MR perfusion of the right cerebellopontine angle lesion is consistent with the presence of neoplasm.  Will follow up with Dr. Alatorre in 6 months.\par \par Today he notes balance, taste, and fatigue have all improved. Denies headaches. Hearing has declined on both sides, but more on the right. Overall much better.  He continues to have reduced taste.

## 2020-07-09 NOTE — DISEASE MANAGEMENT
[Clinical] : TNM Stage: c [N/A] : Currently not applicable [NTNM] : X [TTNM] : X [FreeTextEntry4] : CPA mass [MTNM] : X

## 2020-07-09 NOTE — PHYSICAL EXAM
[General Appearance - Well Developed] : well developed [Normal] : oriented to person, place and time, the affect was normal, the mood was normal and not anxious [Oriented To Time, Place, And Person] : oriented to person, place, and time [No Focal Deficits] : no focal deficits [de-identified] : physical exam limited due to telehealth

## 2020-07-09 NOTE — REVIEW OF SYSTEMS
[Fatigue] : fatigue [Loss of Hearing] : loss of hearing [Negative] : Psychiatric [Nausea: Grade 1 - Loss of appetite without alteration in eating habits] : Nausea: Grade 1 - Loss of appetite without alteration in eating habits [Fatigue: Grade 1 - Fatigue relieved by rest] : Fatigue: Grade 1 - Fatigue relieved by rest [Dysgeusia: Grade 1- Altered taste but no change in diet] : Dysgeusia: Grade 1 - Altered taste but no change in diet [Abdominal Pain] : abdominal pain [Vomiting] : vomiting [Chills] : no chills [Fever] : no fever [Night Sweats] : no night sweats [Wheezing] : no wheezing [Shortness Of Breath] : no shortness of breath [Confused] : no confusion [Dizziness] : no dizziness [Cough] : no cough [FreeTextEntry2] : appetite improving.  [Fainting] : no fainting [Difficulty Walking] : no difficulty walking [de-identified] : denies headaches.  [FreeTextEntry4] : taste has improved. hearing declined on both sides, worse on the right

## 2020-10-31 ENCOUNTER — RESULT REVIEW (OUTPATIENT)
Age: 70
End: 2020-10-31

## 2020-10-31 ENCOUNTER — OUTPATIENT (OUTPATIENT)
Dept: OUTPATIENT SERVICES | Facility: HOSPITAL | Age: 70
LOS: 1 days | End: 2020-10-31
Payer: MEDICARE

## 2020-10-31 ENCOUNTER — APPOINTMENT (OUTPATIENT)
Dept: MRI IMAGING | Facility: CLINIC | Age: 70
End: 2020-10-31
Payer: MEDICARE

## 2020-10-31 DIAGNOSIS — K08.409 PARTIAL LOSS OF TEETH, UNSPECIFIED CAUSE, UNSPECIFIED CLASS: Chronic | ICD-10-CM

## 2020-10-31 DIAGNOSIS — D32.9 BENIGN NEOPLASM OF MENINGES, UNSPECIFIED: ICD-10-CM

## 2020-10-31 PROCEDURE — 70553 MRI BRAIN STEM W/O & W/DYE: CPT | Mod: 26

## 2020-10-31 PROCEDURE — 70553 MRI BRAIN STEM W/O & W/DYE: CPT

## 2020-10-31 PROCEDURE — A9585: CPT

## 2020-11-03 ENCOUNTER — APPOINTMENT (OUTPATIENT)
Dept: NEUROLOGY | Facility: CLINIC | Age: 70
End: 2020-11-03
Payer: MEDICARE

## 2020-11-05 ENCOUNTER — APPOINTMENT (OUTPATIENT)
Dept: NEUROLOGY | Facility: CLINIC | Age: 70
End: 2020-11-05
Payer: MEDICARE

## 2020-11-05 VITALS
BODY MASS INDEX: 22.76 KG/M2 | HEIGHT: 67 IN | WEIGHT: 145 LBS | OXYGEN SATURATION: 98 % | SYSTOLIC BLOOD PRESSURE: 132 MMHG | RESPIRATION RATE: 18 BRPM | HEART RATE: 60 BPM | DIASTOLIC BLOOD PRESSURE: 81 MMHG | TEMPERATURE: 97.5 F

## 2020-11-05 PROCEDURE — 99215 OFFICE O/P EST HI 40 MIN: CPT

## 2020-11-05 NOTE — PHYSICAL EXAM
[General Appearance - Alert] : alert [General Appearance - In No Acute Distress] : in no acute distress [General Appearance - Well Nourished] : well nourished [Oriented To Time, Place, And Person] : oriented to person, place, and time [Impaired Insight] : insight and judgment were intact [Affect] : the affect was normal [] : no respiratory distress [Respiration, Rhythm And Depth] : normal respiratory rhythm and effort [Exaggerated Use Of Accessory Muscles For Inspiration] : no accessory muscle use [Abnormal Walk] : normal gait [FreeTextEntry1] : Patient seen and examined\par Alert, awake , Oriented X 3. Speech clear and fluent\par PERRLA, EOMI, VFF. [place]\par Slight flattening of right NLF,\par Tongue protrudes midline\par PAIGE x 4 with good and equal strength\par FFM, coordination equal bilaterally\par Sensation intact bilaterally\par Gait steady. Ambulating independently\par

## 2020-11-05 NOTE — DISCUSSION/SUMMARY
[FreeTextEntry1] : Patient seen and examined.\par Clinically and radiographically stable.\par Will do a clinical follow up along with an MRI in 6 months.\par In the interim, if any questions patient knows to call me.

## 2020-11-05 NOTE — HISTORY OF PRESENT ILLNESS
[FreeTextEntry1] : Mr. Gustavo Arciniega is a 68 yo male who presented at this office for a neuro oncology follow up\par In brief\par \par \par PMH of 2 episodes what has been called transient global amnesia. The first occurred in January, 2015 - he was evaluated at [facility] and again in November, 2015. He was evaluated by a neurologist - Dr. Uma Deal:\par \par \par MRI brain 1/31/15: Right CPA homogenously enhancing mass measuring 1.3 x 0.8 x 1.1 cm with enhancing dural tail.\par MRA brain 1/31/15: without hemodynamically significant stenosis.\par 72 hour ambulatory EEG was normal\par EEG 2/1/5 normal.\par \par CPA angle mass was felt to be an incidental meningioma and was being monitored by Dr. Chung with interval scans. Between 2015 and 2019, there has been a gradual increased in size. He has developed bilateral hearing loss - right more than left (verified by VNG). He has also seen Dr. Shahid (ENT) and Dr. Kc who did not recommend surgery and referred the patient to Dr. Wheeler in August, 2019 for consideration for radiation. \par He completed radiation therapy in October and received a total of 5040 cGy from 9/13 - 10/22/19 over 28 fractions.\par \par On his follow up appointment with Dr. Wheeler on January 8, he complained of increased fatigue (which had begun during treatment and had reportedly increased since stopping). He also noted left sided headache, metallic taste with poor appetite, and reduced mental sharpness.\par \par His most recent MRI brain, post-treatment on 12/20/19 - showed a stable right CPA mass with stable mass effect on the brainstem.\par Since restarting steroids, his headache resolved immediately. He had been experiencing a metallic taste in his mouth which has been improving. He no longer has a sense of overwhelming fatigue - in fact he feels that the steroids are interrupting his sleep and that he is more tired but also more alert. He notes a tremor in his hands. He still feels that he is occasionally not as sharp mentally and that when he arises he has a sense of being tilted towards the left. He has had no falls. Recommended balance therapy\par 6/2020- MRI unchanged\par 11/23/2020- Patient here for a follow up with a new MRI. Patient saw an audiologist and hearing per exam remain the same

## 2021-01-14 ENCOUNTER — APPOINTMENT (OUTPATIENT)
Dept: MRI IMAGING | Facility: IMAGING CENTER | Age: 71
End: 2021-01-14

## 2021-01-28 ENCOUNTER — APPOINTMENT (OUTPATIENT)
Dept: RADIATION ONCOLOGY | Facility: CLINIC | Age: 71
End: 2021-01-28

## 2021-05-11 ENCOUNTER — APPOINTMENT (OUTPATIENT)
Dept: MRI IMAGING | Facility: IMAGING CENTER | Age: 71
End: 2021-05-11
Payer: MEDICARE

## 2021-05-11 ENCOUNTER — OUTPATIENT (OUTPATIENT)
Dept: OUTPATIENT SERVICES | Facility: HOSPITAL | Age: 71
LOS: 1 days | End: 2021-05-11
Payer: MEDICARE

## 2021-05-11 ENCOUNTER — APPOINTMENT (OUTPATIENT)
Dept: NEUROLOGY | Facility: CLINIC | Age: 71
End: 2021-05-11
Payer: MEDICARE

## 2021-05-11 ENCOUNTER — RESULT REVIEW (OUTPATIENT)
Age: 71
End: 2021-05-11

## 2021-05-11 VITALS
BODY MASS INDEX: 22.76 KG/M2 | TEMPERATURE: 97.8 F | SYSTOLIC BLOOD PRESSURE: 156 MMHG | WEIGHT: 145 LBS | HEIGHT: 67 IN | OXYGEN SATURATION: 98 % | RESPIRATION RATE: 18 BRPM | HEART RATE: 78 BPM | DIASTOLIC BLOOD PRESSURE: 77 MMHG

## 2021-05-11 DIAGNOSIS — K08.409 PARTIAL LOSS OF TEETH, UNSPECIFIED CAUSE, UNSPECIFIED CLASS: Chronic | ICD-10-CM

## 2021-05-11 DIAGNOSIS — D32.9 BENIGN NEOPLASM OF MENINGES, UNSPECIFIED: ICD-10-CM

## 2021-05-11 PROCEDURE — 99215 OFFICE O/P EST HI 40 MIN: CPT

## 2021-05-11 PROCEDURE — 70553 MRI BRAIN STEM W/O & W/DYE: CPT | Mod: 26,MH

## 2021-05-11 PROCEDURE — 70553 MRI BRAIN STEM W/O & W/DYE: CPT

## 2021-05-11 PROCEDURE — A9585: CPT

## 2021-05-11 RX ORDER — CHROMIUM 200 MCG
1000 TABLET ORAL
Refills: 0 | Status: DISCONTINUED | COMMUNITY
Start: 2019-08-06 | End: 2021-05-11

## 2021-05-11 RX ORDER — SERTRALINE 25 MG/1
25 TABLET, FILM COATED ORAL
Refills: 0 | Status: DISCONTINUED | COMMUNITY
Start: 2020-07-08 | End: 2021-05-11

## 2021-05-11 NOTE — PHYSICAL EXAM
[General Appearance - Alert] : alert [General Appearance - In No Acute Distress] : in no acute distress [General Appearance - Well Nourished] : well nourished [Oriented To Time, Place, And Person] : oriented to person, place, and time [Impaired Insight] : insight and judgment were intact [Affect] : the affect was normal [] : no respiratory distress [Respiration, Rhythm And Depth] : normal respiratory rhythm and effort [Exaggerated Use Of Accessory Muscles For Inspiration] : no accessory muscle use [Abnormal Walk] : normal gait [FreeTextEntry1] : \par Patient seen and examined\par Alert, awake , Oriented X 3. Speech clear and fluent\par PERRLA, EOMI, VFF. [place] on the right but able to hear finger rub bilaterally\par Face appears symmetric\par Tongue protrudes midline\par PAIGE x 4 with good and equal strength\par FFM, coordination equal bilaterally\par Sensation intact bilaterally\par Gait steady. Ambulating independently.

## 2021-05-11 NOTE — DISCUSSION/SUMMARY
[FreeTextEntry1] : Patient seen and examined\par I have personally reviewed MRI - Right CPA mass appears smaller - official review is pending.\par Recommended to follow up with his PCP and Hematologist.\par may benefit from restarting vestibular therapy for balance.\par Will do a clinical follow up along with an MRI in a year\par In the interim, if any concerns patient knows to call our office

## 2021-05-11 NOTE — HISTORY OF PRESENT ILLNESS
[FreeTextEntry1] : Mr. Gustavo Arciniega is a 70 yo male who presented at this office for a neuro oncology follow up\par In brief\par \par \par PMH of 2 episodes what has been called transient global amnesia. The first occurred in January, 2015 - he was evaluated at [facility] and again in November, 2015. He was evaluated by a neurologist - Dr. Uma Deal:\par \par \par MRI brain 1/31/15: Right CPA homogenously enhancing mass measuring 1.3 x 0.8 x 1.1 cm with enhancing dural tail.\par MRA brain 1/31/15: without hemodynamically significant stenosis.\par 72 hour ambulatory EEG was normal\par EEG 2/1/5 normal.\par \par CPA angle mass was felt to be an incidental meningioma and was being monitored by Dr. Chung with interval scans. Between 2015 and 2019, there has been a gradual increased in size. He has developed bilateral hearing loss - right more than left (verified by VNG). He has also seen Dr. Shahid (ENT) and Dr. Kc who did not recommend surgery and referred the patient to Dr. Wheeler in August, 2019 for consideration for radiation. \par He completed radiation therapy in October and received a total of 5040 cGy from 9/13 - 10/22/19 over 28 fractions.\par \par On his follow up appointment with Dr. Wheeler on January 8, he complained of increased fatigue (which had begun during treatment and had reportedly increased since stopping). He also noted left sided headache, metallic taste with poor appetite, and reduced mental sharpness.\par \par His most recent MRI brain, post-treatment on 12/20/19 - showed a stable right CPA mass with stable mass effect on the brainstem.\par Since restarting steroids, his headache resolved immediately. He had been experiencing a metallic taste in his mouth which has been improving. He no longer has a sense of overwhelming fatigue - in fact he feels that the steroids are interrupting his sleep and that he is more tired but also more alert. He notes a tremor in his hands. He still feels that he is occasionally not as sharp mentally and that when he arises he has a sense of being tilted towards the left. He has had no falls. Recommended balance therapy\par 6/2020- MRI unchanged\par 11/23/2020- MRI stable. Patient saw an audiologist and hearing per exam remain the same \par 5/11/2021 - Patient here for a follow up. Reports that he continues to feel his balance is marginally worse. He also feels more tired lately. He is scheduled to see Dr Crump( for his polycythemia vera) this Thursday.\par

## 2022-03-16 ENCOUNTER — APPOINTMENT (OUTPATIENT)
Dept: NEUROLOGY | Facility: CLINIC | Age: 72
End: 2022-03-16

## 2022-03-16 ENCOUNTER — APPOINTMENT (OUTPATIENT)
Dept: MRI IMAGING | Facility: CLINIC | Age: 72
End: 2022-03-16

## 2022-03-23 ENCOUNTER — OUTPATIENT (OUTPATIENT)
Dept: OUTPATIENT SERVICES | Facility: HOSPITAL | Age: 72
LOS: 1 days | End: 2022-03-23
Payer: MEDICARE

## 2022-03-23 ENCOUNTER — APPOINTMENT (OUTPATIENT)
Dept: MRI IMAGING | Facility: CLINIC | Age: 72
End: 2022-03-23

## 2022-03-23 ENCOUNTER — APPOINTMENT (OUTPATIENT)
Dept: MRI IMAGING | Facility: IMAGING CENTER | Age: 72
End: 2022-03-23

## 2022-03-23 DIAGNOSIS — D32.9 BENIGN NEOPLASM OF MENINGES, UNSPECIFIED: ICD-10-CM

## 2022-03-23 DIAGNOSIS — K08.409 PARTIAL LOSS OF TEETH, UNSPECIFIED CAUSE, UNSPECIFIED CLASS: Chronic | ICD-10-CM

## 2022-03-23 PROCEDURE — 70553 MRI BRAIN STEM W/O & W/DYE: CPT | Mod: ME

## 2022-03-23 PROCEDURE — G1004: CPT

## 2022-03-23 PROCEDURE — A9585: CPT

## 2022-03-23 PROCEDURE — 70553 MRI BRAIN STEM W/O & W/DYE: CPT | Mod: 26,ME

## 2022-03-30 ENCOUNTER — APPOINTMENT (OUTPATIENT)
Dept: NEUROLOGY | Facility: CLINIC | Age: 72
End: 2022-03-30
Payer: MEDICARE

## 2022-03-30 PROCEDURE — 99215 OFFICE O/P EST HI 40 MIN: CPT

## 2022-03-31 NOTE — PHYSICAL EXAM
[General Appearance - Alert] : alert [General Appearance - In No Acute Distress] : in no acute distress [General Appearance - Well Nourished] : well nourished [] : no respiratory distress [Respiration, Rhythm And Depth] : normal respiratory rhythm and effort [Exaggerated Use Of Accessory Muscles For Inspiration] : no accessory muscle use [FreeTextEntry1] : Patient seen and examined\par Alert, awake , Oriented X 3. Speech clear and fluent\par PERRLA, EOMI, VFF. [place]  , has bilateral hearing aids on\par Face appears symmetric\par Tongue protrudes midline\par PAIGE x 4 with good and equal strength\par FFM, coordination equal bilaterally\par Sensation intact bilaterally\par Gait steady. Ambulating independently.

## 2022-03-31 NOTE — HISTORY OF PRESENT ILLNESS
[FreeTextEntry1] : Mr. Gustavo Arciniega is a 68 yo male who presented at this office for a neuro oncology follow up\par In brief\par \par \par PMH of 2 episodes what has been called transient global amnesia. The first occurred in January, 2015 - he was evaluated at [facility] and again in November, 2015. He was evaluated by a neurologist - Dr. Uma Deal:\par \par \par MRI brain 1/31/15: Right CPA homogenously enhancing mass measuring 1.3 x 0.8 x 1.1 cm with enhancing dural tail.\par MRA brain 1/31/15: without hemodynamically significant stenosis.\par 72 hour ambulatory EEG was normal\par EEG 2/1/5 normal.\par \par CPA angle mass was felt to be an incidental meningioma and was being monitored by Dr. Chung with interval scans. Between 2015 and 2019, there has been a gradual increased in size. He has developed bilateral hearing loss - right more than left (verified by VNG). He has also seen Dr. Shahid (ENT) and Dr. Kc who did not recommend surgery and referred the patient to Dr. Wheeler in August, 2019 for consideration for radiation. \par He completed radiation therapy in October and received a total of 5040 cGy from 9/13 - 10/22/19 over 28 fractions.\par \par On his follow up appointment with Dr. Wheeler on January 8, he complained of increased fatigue (which had begun during treatment and had reportedly increased since stopping). He also noted left sided headache, metallic taste with poor appetite, and reduced mental sharpness.\par \par His most recent MRI brain, post-treatment on 12/20/19 - showed a stable right CPA mass with stable mass effect on the brainstem.\par Since restarting steroids, his headache resolved immediately. He had been experiencing a metallic taste in his mouth which has been improving. He no longer has a sense of overwhelming fatigue - in fact he feels that the steroids are interrupting his sleep and that he is more tired but also more alert. He notes a tremor in his hands. He still feels that he is occasionally not as sharp mentally and that when he arises he has a sense of being tilted towards the left. He has had no falls. Recommended balance therapy\par 6/2020- MRI unchanged\par 11/23/2020- MRI stable. Patient saw an audiologist and hearing per exam remain the same \par 5/11/2021 - MRI stable - recommended follow up in a year\par 3/30/2022 - Patient here for a follow up.\par \par

## 2022-03-31 NOTE — DISCUSSION/SUMMARY
[FreeTextEntry1] : Patient seen and examined\par  MRI reviewed - Right CPA mass stable\par Recommended to continue follow up with Audiologist\par ? episodes of TGA - Will do a 72 hour EEG\par Will do a clinical follow up along with an MRI in 6 months ( October, 4,2022)\par In the interim, if any concerns patient knows to call our office.

## 2022-05-22 ENCOUNTER — NON-APPOINTMENT (OUTPATIENT)
Age: 72
End: 2022-05-22

## 2022-05-25 ENCOUNTER — APPOINTMENT (OUTPATIENT)
Dept: NEUROLOGY | Facility: CLINIC | Age: 72
End: 2022-05-25
Payer: MEDICARE

## 2022-05-25 PROCEDURE — 95816 EEG AWAKE AND DROWSY: CPT

## 2022-05-26 PROCEDURE — 95708 EEG WO VID EA 12-26HR UNMNTR: CPT

## 2022-05-27 PROCEDURE — 95708 EEG WO VID EA 12-26HR UNMNTR: CPT

## 2022-05-28 PROCEDURE — 95708 EEG WO VID EA 12-26HR UNMNTR: CPT

## 2022-05-28 PROCEDURE — 95723 EEG PHY/QHP>60<84 HR W/O VID: CPT

## 2022-05-28 PROCEDURE — 95700 EEG CONT REC W/VID EEG TECH: CPT

## 2022-06-04 ENCOUNTER — NON-APPOINTMENT (OUTPATIENT)
Age: 72
End: 2022-06-04

## 2022-08-11 ENCOUNTER — RESULT REVIEW (OUTPATIENT)
Age: 72
End: 2022-08-11

## 2022-10-06 ENCOUNTER — OUTPATIENT (OUTPATIENT)
Dept: OUTPATIENT SERVICES | Facility: HOSPITAL | Age: 72
LOS: 1 days | End: 2022-10-06
Payer: MEDICARE

## 2022-10-06 ENCOUNTER — APPOINTMENT (OUTPATIENT)
Dept: NEUROLOGY | Facility: CLINIC | Age: 72
End: 2022-10-06

## 2022-10-06 ENCOUNTER — APPOINTMENT (OUTPATIENT)
Dept: MRI IMAGING | Facility: IMAGING CENTER | Age: 72
End: 2022-10-06

## 2022-10-06 VITALS
SYSTOLIC BLOOD PRESSURE: 118 MMHG | OXYGEN SATURATION: 98 % | HEART RATE: 78 BPM | RESPIRATION RATE: 16 BRPM | DIASTOLIC BLOOD PRESSURE: 64 MMHG

## 2022-10-06 DIAGNOSIS — D32.9 BENIGN NEOPLASM OF MENINGES, UNSPECIFIED: ICD-10-CM

## 2022-10-06 DIAGNOSIS — K08.409 PARTIAL LOSS OF TEETH, UNSPECIFIED CAUSE, UNSPECIFIED CLASS: Chronic | ICD-10-CM

## 2022-10-06 DIAGNOSIS — R26.9 UNSPECIFIED ABNORMALITIES OF GAIT AND MOBILITY: ICD-10-CM

## 2022-10-06 PROCEDURE — 70553 MRI BRAIN STEM W/O & W/DYE: CPT

## 2022-10-06 PROCEDURE — 99215 OFFICE O/P EST HI 40 MIN: CPT

## 2022-10-06 PROCEDURE — 70553 MRI BRAIN STEM W/O & W/DYE: CPT | Mod: 26,MH

## 2022-10-06 PROCEDURE — A9585: CPT

## 2022-10-06 NOTE — DISCUSSION/SUMMARY
[FreeTextEntry1] : Patient seen and examined\par  MRI reviewed - Right CPA mass stable to my review. Official report to follow\par EEG from 6/2022 reviewed - Normal. No epileptiform activity noted\par Recommended PT- Balance therapy\par Will do a clinical follow up along with an MRI in a year ( Assuming official MRI report stable)\par In the interim, if any concerns patient knows to call our office.

## 2022-10-06 NOTE — HISTORY OF PRESENT ILLNESS
[FreeTextEntry1] : Mr. Gustavo Arciniega is a 70 yo male who presented at this office for a neuro oncology follow up\par In brief\par \par \par PMH of 2 episodes what has been called transient global amnesia. The first occurred in January, 2015 - he was evaluated at [facility] and again in November, 2015. He was evaluated by a neurologist - Dr. Uma Deal:\par \par \par MRI brain 1/31/15: Right CPA homogenously enhancing mass measuring 1.3 x 0.8 x 1.1 cm with enhancing dural tail.\par MRA brain 1/31/15: without hemodynamically significant stenosis.\par 72 hour ambulatory EEG was normal\par EEG 2/1/5 normal.\par \par CPA angle mass was felt to be an incidental meningioma and was being monitored by Dr. Chung with interval scans. Between 2015 and 2019, there has been a gradual increased in size. He has developed bilateral hearing loss - right more than left (verified by VNG). He has also seen Dr. Shahid (ENT) and Dr. Kc who did not recommend surgery and referred the patient to Dr. Wheeler in August, 2019 for consideration for radiation. \par He completed radiation therapy in October and received a total of 5040 cGy from 9/13 - 10/22/19 over 28 fractions.\par \par On his follow up appointment with Dr. hWeeler on January 8, he complained of increased fatigue (which had begun during treatment and had reportedly increased since stopping). He also noted left sided headache, metallic taste with poor appetite, and reduced mental sharpness.\par \par His most recent MRI brain, post-treatment on 12/20/19 - showed a stable right CPA mass with stable mass effect on the brainstem.\par Since restarting steroids, his headache resolved immediately. He had been experiencing a metallic taste in his mouth which has been improving. He no longer has a sense of overwhelming fatigue - in fact he feels that the steroids are interrupting his sleep and that he is more tired but also more alert. He notes a tremor in his hands. He still feels that he is occasionally not as sharp mentally and that when he arises he has a sense of being tilted towards the left. He has had no falls. Recommended balance therapy\par 6/2020- MRI unchanged\par 11/23/2020- MRI stable. Patient saw an audiologist and hearing per exam remain the same \par 5/11/2021, 3/30/2022  - MRI stable - recommended follow up in a year\par 10/6/2022 - Here for a follow up along with an MRI . Offers no new complaints. His balance continues to be off. He has tried Urbano-Chi with no improvement in symptoms. He would like to try balance therapy again\par \par \par \par

## 2022-10-06 NOTE — PHYSICAL EXAM
[General Appearance - Alert] : alert [General Appearance - In No Acute Distress] : in no acute distress [General Appearance - Well Nourished] : well nourished [Oriented To Time, Place, And Person] : oriented to person, place, and time [] : no respiratory distress [Respiration, Rhythm And Depth] : normal respiratory rhythm and effort [Exaggerated Use Of Accessory Muscles For Inspiration] : no accessory muscle use [Edema] : there was no peripheral edema [FreeTextEntry1] : Patient seen and examined\par Alert, awake , Oriented X 3. Speech clear and fluent\par PERRLA, EOMI, VFF. [place] , has bilateral hearing aids on\par Face appears symmetric\par Tongue protrudes midline\par PAIGE x 4 with good and equal strength\par FFM, coordination equal bilaterally\par Sensation intact bilaterally\par Gait steady. Ambulating independently.

## 2022-10-23 ENCOUNTER — EMERGENCY (EMERGENCY)
Facility: HOSPITAL | Age: 72
LOS: 1 days | Discharge: HOME | End: 2022-10-23
Attending: EMERGENCY MEDICINE | Admitting: EMERGENCY MEDICINE
Payer: MEDICARE

## 2022-10-23 VITALS
HEART RATE: 57 BPM | TEMPERATURE: 98 F | RESPIRATION RATE: 16 BRPM | OXYGEN SATURATION: 97 % | SYSTOLIC BLOOD PRESSURE: 151 MMHG | DIASTOLIC BLOOD PRESSURE: 84 MMHG

## 2022-10-23 VITALS
TEMPERATURE: 98 F | WEIGHT: 147.93 LBS | HEART RATE: 66 BPM | DIASTOLIC BLOOD PRESSURE: 77 MMHG | RESPIRATION RATE: 16 BRPM | HEIGHT: 66 IN | OXYGEN SATURATION: 97 % | SYSTOLIC BLOOD PRESSURE: 176 MMHG

## 2022-10-23 DIAGNOSIS — K08.409 PARTIAL LOSS OF TEETH, UNSPECIFIED CAUSE, UNSPECIFIED CLASS: Chronic | ICD-10-CM

## 2022-10-23 LAB
ALBUMIN SERPL ELPH-MCNC: 3 G/DL — LOW (ref 3.3–5)
ALP SERPL-CCNC: 60 U/L — SIGNIFICANT CHANGE UP (ref 40–120)
ALT FLD-CCNC: 15 U/L — SIGNIFICANT CHANGE UP (ref 12–78)
ANION GAP SERPL CALC-SCNC: 7 MMOL/L — SIGNIFICANT CHANGE UP (ref 5–17)
APPEARANCE UR: ABNORMAL
AST SERPL-CCNC: 10 U/L — LOW (ref 15–37)
BASOPHILS # BLD AUTO: 0.06 K/UL — SIGNIFICANT CHANGE UP (ref 0–0.2)
BASOPHILS NFR BLD AUTO: 0.8 % — SIGNIFICANT CHANGE UP (ref 0–2)
BILIRUB SERPL-MCNC: 0.5 MG/DL — SIGNIFICANT CHANGE UP (ref 0.2–1.2)
BILIRUB UR-MCNC: NEGATIVE — SIGNIFICANT CHANGE UP
BUN SERPL-MCNC: 27 MG/DL — HIGH (ref 7–23)
CALCIUM SERPL-MCNC: 8.7 MG/DL — SIGNIFICANT CHANGE UP (ref 8.5–10.1)
CHLORIDE SERPL-SCNC: 110 MMOL/L — HIGH (ref 96–108)
CO2 SERPL-SCNC: 28 MMOL/L — SIGNIFICANT CHANGE UP (ref 22–31)
COLOR SPEC: SIGNIFICANT CHANGE UP
CREAT SERPL-MCNC: 1.1 MG/DL — SIGNIFICANT CHANGE UP (ref 0.5–1.3)
DIFF PNL FLD: ABNORMAL
EGFR: 71 ML/MIN/1.73M2 — SIGNIFICANT CHANGE UP
EOSINOPHIL # BLD AUTO: 0.18 K/UL — SIGNIFICANT CHANGE UP (ref 0–0.5)
EOSINOPHIL NFR BLD AUTO: 2.3 % — SIGNIFICANT CHANGE UP (ref 0–6)
GLUCOSE SERPL-MCNC: 100 MG/DL — HIGH (ref 70–99)
GLUCOSE UR QL: NEGATIVE — SIGNIFICANT CHANGE UP
HCT VFR BLD CALC: 42.8 % — SIGNIFICANT CHANGE UP (ref 39–50)
HGB BLD-MCNC: 12.9 G/DL — LOW (ref 13–17)
IMM GRANULOCYTES NFR BLD AUTO: 0.9 % — SIGNIFICANT CHANGE UP (ref 0–0.9)
KETONES UR-MCNC: ABNORMAL
LEUKOCYTE ESTERASE UR-ACNC: ABNORMAL
LYMPHOCYTES # BLD AUTO: 0.89 K/UL — LOW (ref 1–3.3)
LYMPHOCYTES # BLD AUTO: 11.2 % — LOW (ref 13–44)
MCHC RBC-ENTMCNC: 22.3 PG — LOW (ref 27–34)
MCHC RBC-ENTMCNC: 30.1 GM/DL — LOW (ref 32–36)
MCV RBC AUTO: 74 FL — LOW (ref 80–100)
MONOCYTES # BLD AUTO: 0.51 K/UL — SIGNIFICANT CHANGE UP (ref 0–0.9)
MONOCYTES NFR BLD AUTO: 6.4 % — SIGNIFICANT CHANGE UP (ref 2–14)
NEUTROPHILS # BLD AUTO: 6.21 K/UL — SIGNIFICANT CHANGE UP (ref 1.8–7.4)
NEUTROPHILS NFR BLD AUTO: 78.4 % — HIGH (ref 43–77)
NITRITE UR-MCNC: POSITIVE
NRBC # BLD: 0 /100 WBCS — SIGNIFICANT CHANGE UP (ref 0–0)
PH UR: 6.5 — SIGNIFICANT CHANGE UP (ref 5–8)
PLATELET # BLD AUTO: 547 K/UL — HIGH (ref 150–400)
POTASSIUM SERPL-MCNC: 3.8 MMOL/L — SIGNIFICANT CHANGE UP (ref 3.5–5.3)
POTASSIUM SERPL-SCNC: 3.8 MMOL/L — SIGNIFICANT CHANGE UP (ref 3.5–5.3)
PROT SERPL-MCNC: 6.3 G/DL — SIGNIFICANT CHANGE UP (ref 6–8.3)
PROT UR-MCNC: 500 MG/DL
RBC # BLD: 5.78 M/UL — SIGNIFICANT CHANGE UP (ref 4.2–5.8)
RBC # FLD: 16.4 % — HIGH (ref 10.3–14.5)
SODIUM SERPL-SCNC: 145 MMOL/L — SIGNIFICANT CHANGE UP (ref 135–145)
SP GR SPEC: 1.01 — SIGNIFICANT CHANGE UP (ref 1.01–1.02)
UROBILINOGEN FLD QL: 1
WBC # BLD: 7.92 K/UL — SIGNIFICANT CHANGE UP (ref 3.8–10.5)
WBC # FLD AUTO: 7.92 K/UL — SIGNIFICANT CHANGE UP (ref 3.8–10.5)

## 2022-10-23 PROCEDURE — 80053 COMPREHEN METABOLIC PANEL: CPT

## 2022-10-23 PROCEDURE — 99283 EMERGENCY DEPT VISIT LOW MDM: CPT | Mod: 25

## 2022-10-23 PROCEDURE — 87186 SC STD MICRODIL/AGAR DIL: CPT

## 2022-10-23 PROCEDURE — 81001 URINALYSIS AUTO W/SCOPE: CPT

## 2022-10-23 PROCEDURE — 87086 URINE CULTURE/COLONY COUNT: CPT

## 2022-10-23 PROCEDURE — 85025 COMPLETE CBC W/AUTO DIFF WBC: CPT

## 2022-10-23 PROCEDURE — 99284 EMERGENCY DEPT VISIT MOD MDM: CPT

## 2022-10-23 PROCEDURE — 36415 COLL VENOUS BLD VENIPUNCTURE: CPT

## 2022-10-23 PROCEDURE — 96360 HYDRATION IV INFUSION INIT: CPT

## 2022-10-23 PROCEDURE — 87077 CULTURE AEROBIC IDENTIFY: CPT

## 2022-10-23 RX ORDER — SODIUM CHLORIDE 9 MG/ML
1000 INJECTION INTRAMUSCULAR; INTRAVENOUS; SUBCUTANEOUS ONCE
Refills: 0 | Status: COMPLETED | OUTPATIENT
Start: 2022-10-23 | End: 2022-10-23

## 2022-10-23 RX ADMIN — SODIUM CHLORIDE 1000 MILLILITER(S): 9 INJECTION INTRAMUSCULAR; INTRAVENOUS; SUBCUTANEOUS at 12:47

## 2022-10-23 RX ADMIN — SODIUM CHLORIDE 1000 MILLILITER(S): 9 INJECTION INTRAMUSCULAR; INTRAVENOUS; SUBCUTANEOUS at 11:45

## 2022-10-23 RX ADMIN — Medication 100 MILLIGRAM(S): at 12:23

## 2022-10-23 NOTE — ED ADULT NURSE NOTE - OBJECTIVE STATEMENT
patient came in ED from home with c/o maroon colored urine without blood clots X 03:00am. patient reported last Thursday a Moon catheter was inserted by Dr Mariscal after completing urodynamics. afebrile. alert and oriented x 4. patient denies pain at this time.

## 2022-10-23 NOTE — ED PROVIDER NOTE - OBJECTIVE STATEMENT
Patient is a 72-year-old male who presents emergency room with a chief complaint of hematuria.  Past medical history of polycythemia vera history of meningioma status postresection gross hearing loss history of BPH history of prostatitis.  Patient reports that he has a longstanding history of BPH and that he has been following with his urologist Dr. Mariscal.  He reports that approximately 2 weeks ago he underwent urodynamic testing which included the insertion of a Moon catheter.  He reports that the catheter insertion was complicated and the nurse could not do it despite attempts, the Moon was ultimately placed by Dr. Mariscal.  He was then given 1 day of antibiotics although he cannot recall the name of the antibiotic.  Several days later he noted some brown urine and he called the office.  He was placed on a 4 to 5-day course of antibiotics again unsure of the name.  Symptoms seem to resolve and then last Tuesday he noted increased pain and difficulty urinating he was seen in the office on Thursday a catheter was placed.  Patient was placed on doxycycline for a suspected urinary tract infection and is BPH meds were changed.  He reports that most of yesterday his urine seemed clear but then he again developed dark-colored bloody urine today.  He does note some suprapubic discomfort but he is passing urine denies noting any clots.  Patient is on aspirin but no other blood thinners. Denies fever, chills, N/V, CP, SOB, abd pain.

## 2022-10-23 NOTE — ED PROVIDER NOTE - CROS ED NEURO ALL NEG
PROSTATECTOMY LAPAROSCOPIC WITH DAVINCI ROBOT  Progress Note    James Gary Meese  8/26/2022    Pre-op Diagnosis:   Prostate cancer (HCC) [C61]       Post-Op Diagnosis Codes:     * Prostate cancer (HCC) [C61]       Procedure(s):  ROBOTIC ASSISTED LAPAROSCOPIC PROSTATECTOMY, RIGHT NERVE SPARING, LEFT WIDE DISSECTION, BILATERAL PELVIC LYMPH NODE DISSECTION    Surgeon(s):  Mushtaq Rudolph MD Stark, Timothy, MD    Anesthesia: General    Staff:   Circulator: Tremayne Cordoba RN; Marcella Cole, CHARLES; Carlos Shaw RN  Scrub Person: Laura Ferrer; Allison Shane; Luis Enrique Quinonez; Emerald Esparza RN  Nursing Assistant: Breanna Reece  Assistant: Julio Cole PA-C  Assistant: Julio Cole PA-C      Estimated Blood Loss: 100 mL    Urine Voided: * No values recorded between 8/26/2022  1:00 PM and 8/26/2022  4:21 PM *    Specimens:                Specimens     ID Source Type Tests Collected By Collected At Frozen?    A Prostate Tissue · TISSUE PATHOLOGY EXAM   Mushtaq Rudolph MD 8/26/22 1437     Description: Maria Prostetic Fat    This specimen was not marked as sent.    B Prostate Tissue · TISSUE PATHOLOGY EXAM   Mushtaq Rudolph MD 8/26/22 1607     Description: prostate, seminal vesicles, bilateral pelvic lymph nodes    This specimen was not marked as sent.                Drains:   Urethral Catheter Double-lumen;Non-latex;Silicone;Straight-tip 16 Fr. (Active)       Findings: Uncomplicated robotic radical prostatectomy, right nerve sparing, left wide dissection, bilateral pelvic lymphadenectomy. 20 Fr tamez, LLQ TOSHIA drain. Water tight urethrovesical anastomosis.       Complications: None    Assistant: Julio Cole PA-C  was responsible for performing the following activities: Retraction, Suction, Irrigation, Suturing, Closing and Placing Dressing and their skilled assistance was necessary for the success of this case.    Mushtaq Rudolph MD     Date: 8/26/2022  Time: 16:27 EDT       negative...

## 2022-10-23 NOTE — ED ADULT NURSE NOTE - NSICDXFAMILYHX_GEN_ALL_CORE_FT
FAMILY HISTORY:  Father  Still living? Unknown  Family history of hypertension in father, Age at diagnosis: Age Unknown    Mother  Still living? Unknown  Family history of hypertension in father, Age at diagnosis: Age Unknown

## 2022-10-23 NOTE — ED PROVIDER NOTE - CLINICAL SUMMARY MEDICAL DECISION MAKING FREE TEXT BOX
Patient is a 72-year-old male who presents emergency room with a chief complaint of hematuria.  Past medical history of polycythemia vera history of meningioma status postresection gross hearing loss history of BPH history of prostatitis.  Patient reports that he has a longstanding history of BPH and that he has been following with his urologist Dr. Mariscal.  He reports that approximately 2 weeks ago he underwent urodynamic testing which included the insertion of a Moon catheter.  He reports that the catheter insertion was complicated and the nurse could not do it despite attempts, the Moon was ultimately placed by Dr. Mariscal.  He was then given 1 day of antibiotics although he cannot recall the name of the antibiotic.  Several days later he noted some brown urine and he called the office.  He was placed on a 4 to 5-day course of antibiotics again unsure of the name.  Symptoms seem to resolve and then last Tuesday he noted increased pain and difficulty urinating he was seen in the office on Thursday a catheter was placed.  Patient was placed on doxycycline for a suspected urinary tract infection and is BPH meds were changed.  He reports that most of yesterday his urine seemed clear but then he again developed dark-colored bloody urine today.  He does note some suprapubic discomfort but he is passing urine denies noting any clots.  Patient is on aspirin but no other blood thinners. Denies fever, chills, N/V, CP, SOB, abd pain. Patient presenting to the emergency room with a chief complaint of hematuria status post recent instrumentation.  Currently on doxycycline.  Now with worsening hematuria although still able to urinate.  Will obtain screening labs hydrate and monitor urine to see if it clears.  Patient with no signs or symptoms of secondary renal stones.  Labs were noted urine has significantly cleared with no clots.  Patient stable for discharge home with outpatient follow-up advised to maintain hydration.

## 2022-10-23 NOTE — ED PROVIDER NOTE - PATIENT PORTAL LINK FT
You can access the FollowMyHealth Patient Portal offered by [facility] by registering at the following website: [email or web address]. By joining [employer]’s FollowMyHealth portal, you will also be able to view your health information using other applications (apps) compatible with our system.

## 2022-10-23 NOTE — ED PROVIDER NOTE - NSFOLLOWUPINSTRUCTIONS_ED_ALL_ED_FT
Return to the ED for any new or worsening symptoms  Take your medication as prescribed  Continue your current antibiotics  Make sure to remain hydrated  Follow up with urology on Monday  Advance activity as tolerated    Hematuria    WHAT YOU NEED TO KNOW:    Hematuria is blood in your urine. Your urine may be bright red to dark brown.    DISCHARGE INSTRUCTIONS:    Return to the emergency department if:   •You have blood in your urine after a new injury, such as a fall.      •You have severe back or side pain that does not go away with treatment.      Call your doctor if:   •You are urinating very small amounts or not at all.      •You feel like you cannot empty your bladder.      •You have a fever that gets worse or does not go away with treatment.      •You cannot keep liquids or medicines down.      •Your urine gets darker, even after you drink extra liquids.      •You have questions or concerns about your condition, treatment, or care.      Drink liquids as directed: You may need to drink extra liquids to help flush the blood from your body through your urine. Water is the best liquid to drink. Ask how much liquid to drink each day and which liquids are best for you.    Follow up with your doctor as directed: Write down your questions so you remember to ask them during your visits.

## 2022-10-23 NOTE — ED PROVIDER NOTE - CARE PROVIDER_API CALL
Omari Mariscal)  Urology  [address], [address]  [place] [address]  Phone: [phone]  Fax: [phone]  Follow Up Time:

## 2022-10-24 ENCOUNTER — EMERGENCY (EMERGENCY)
Facility: HOSPITAL | Age: 72
LOS: 1 days | Discharge: HOME | End: 2022-10-24
Attending: INTERNAL MEDICINE | Admitting: INTERNAL MEDICINE
Payer: MEDICARE

## 2022-10-24 VITALS — HEIGHT: 66 IN | WEIGHT: 130.07 LBS

## 2022-10-24 DIAGNOSIS — K08.409 PARTIAL LOSS OF TEETH, UNSPECIFIED CAUSE, UNSPECIFIED CLASS: Chronic | ICD-10-CM

## 2022-10-24 PROCEDURE — 99283 EMERGENCY DEPT VISIT LOW MDM: CPT

## 2022-10-24 PROCEDURE — 99282 EMERGENCY DEPT VISIT SF MDM: CPT

## 2022-10-24 NOTE — ED PROVIDER NOTE - OBJECTIVE STATEMENT
Patient states that he was discharged from this ER recently.  Patient states that his Moon catheter is leaking a great deal.  urinary catheter complications 72 y/o male H/O BPH , Patient states that he was treated in the  ER recently for retention, has a  Moon catheter and  is leaking at the sides near the meatus   urinary catheter complications due to occlusion . no fever, no chills, no back pain, no N/V

## 2022-10-24 NOTE — ED PROVIDER NOTE - PATIENT PORTAL LINK FT
You can access the FollowMyHealth Patient Portal offered by [facility] by registering at the following website: [email or web address]. By joining [email or web address]’s FollowMyHealth portal, you will also be able to view your health information using other applications (apps) compatible with our system.

## 2022-10-24 NOTE — ED PROVIDER NOTE - CONSTITUTIONAL, MLM
normal... Well appearing, awake, alert, oriented to person, place, time/situation, relief after cathter irrigation

## 2022-10-24 NOTE — ED ADULT TRIAGE NOTE - CHIEF COMPLAINT QUOTE
Patient states that he was discharged from this ER recently.  Patient states that his Moon catheter is leaking a great deal.

## 2022-10-24 NOTE — ED ADULT NURSE NOTE - ISOLATION TYPE:
----- Message from Liberty Zendejas RN sent at 8/28/2018  9:21 AM CDT -----  Regarding: FW: colonoscopy  Orders for colonoscopy were entered and routed to surgical scheduler on 2/11/2019  Appt ?  -----  1 year f/u colon  Hx polyps  3 day prep      See phone encounter from 8/17/2018  Recommendation for f/u colonoscopy change to be done in 1 year. 7/2019  ----- Message -----  From: Liberty Zendejas RN  Sent: 7/15/2020  To: [facility] Staff Nurse Msg Pool Gh/Deb/Melita/  Subject: colonoscopy                                      2 year f/u colon  Hx polyps  3 day prep       None

## 2022-10-24 NOTE — ED PROVIDER NOTE - CLINICAL SUMMARY MEDICAL DECISION MAKING FREE TEXT BOX
Moon cathter occlusion with blood clot, irrigation relived the occlusion, stable at discharge and patient is following up with urology today

## 2022-10-24 NOTE — ED PROVIDER NOTE - CARE PROVIDER_API CALL
Omari Mariscal)  Urology  [address], [address]  [place] [address]  Phone: [phone]  Fax: [phone]  Follow Up Time: 1-3 Days

## 2022-10-27 LAB
-  AMIKACIN: SIGNIFICANT CHANGE UP
-  AZTREONAM: SIGNIFICANT CHANGE UP
-  CEFEPIME: SIGNIFICANT CHANGE UP
-  CEFTAZIDIME/AVIBACTAM: SIGNIFICANT CHANGE UP
-  CEFTAZIDIME: SIGNIFICANT CHANGE UP
-  CEFTOLOZANE/TAZOBACTAM: SIGNIFICANT CHANGE UP
-  CIPROFLOXACIN: SIGNIFICANT CHANGE UP
-  GENTAMICIN: SIGNIFICANT CHANGE UP
-  IMIPENEM: SIGNIFICANT CHANGE UP
-  LEVOFLOXACIN: SIGNIFICANT CHANGE UP
-  MEROPENEM: SIGNIFICANT CHANGE UP
-  PIPERACILLIN/TAZOBACTAM: SIGNIFICANT CHANGE UP
-  TOBRAMYCIN: SIGNIFICANT CHANGE UP
CULTURE RESULTS: SIGNIFICANT CHANGE UP
METHOD TYPE: SIGNIFICANT CHANGE UP
ORGANISM # SPEC MICROSCOPIC CNT: SIGNIFICANT CHANGE UP
ORGANISM # SPEC MICROSCOPIC CNT: SIGNIFICANT CHANGE UP
SPECIMEN SOURCE: SIGNIFICANT CHANGE UP

## 2022-10-28 NOTE — ED POST DISCHARGE NOTE - DETAILS
MOJGAN Morales: Received call from Flor ([facility]) re: parkerml ucx; pt states he is currently on cipro (resistant); will contact [facility] now for plan Mirvaso Counseling: Mirvaso is a topical medication which can decrease superficial blood flow where applied. Side effects are uncommon and include stinging, redness and allergic reactions.

## 2023-05-09 ENCOUNTER — APPOINTMENT (OUTPATIENT)
Dept: OTOLARYNGOLOGY | Facility: CLINIC | Age: 73
End: 2023-05-09
Payer: MEDICARE

## 2023-05-09 VITALS
DIASTOLIC BLOOD PRESSURE: 84 MMHG | OXYGEN SATURATION: 98 % | HEART RATE: 64 BPM | WEIGHT: 142 LBS | TEMPERATURE: 98 F | HEIGHT: 67 IN | SYSTOLIC BLOOD PRESSURE: 145 MMHG | BODY MASS INDEX: 22.29 KG/M2

## 2023-05-09 PROCEDURE — 92550 TYMPANOMETRY & REFLEX THRESH: CPT

## 2023-05-09 PROCEDURE — 92557 COMPREHENSIVE HEARING TEST: CPT

## 2023-05-09 PROCEDURE — 99204 OFFICE O/P NEW MOD 45 MIN: CPT

## 2023-05-09 NOTE — DATA REVIEWED
[de-identified] : outside  from 2021 revealed r asymmetric snhl, WR- 64%, L- 88% -results reviewed with pt \par  showed b symmetric snhl, WR- R- 80%, L- 84% -results reviewed with pt  [de-identified] : 10/7/22 MRI revealed stable r cpa meningioma -images and report reviewed with pt

## 2023-05-09 NOTE — HISTORY OF PRESENT ILLNESS
[de-identified] : 73 y/o M patient of Dr. Smart who was treated for r cpa/brainstem meningioma s/p conventional radiation in 2019 is presenting with b hearing loss r>l. He wears b hearing aids. He has trouble distinguishing words, he reports sounds are "fuzzy," and he cannot hear music. Outside  from 2021 revealed r asymmetric snhl. He is also c/o disequilibrium. He has done vestibular therapy in the past with minimal relief. Former smoker. No FH or SH pertinent to cc.

## 2023-05-09 NOTE — ASSESSMENT
[FreeTextEntry1] : 1. disequilibrium\par -10/7/22 MRI revealed staple r cpa meningioma -images and report reviewed with pt \par -VNG ordered to understand the cause\par 2. b hf snhl\par -outside  from 2021 revealed r asymmetric snhl, WR- 64%, L- 88% -results reviewed with pt \par - showed b symmetric snhl, WR- R- 80%, L- 84% -results reviewed with pt \par -recommended ha adjustment\par -pt was concerned he has processing disorder\par -CAP test (the elements which can be assessed with this amt of snhl)\par RTC with VNG and CAP test to review findings \par not a CI candidate due to good sds AD and large meningioma filling iac and extending into cpa s/p radiation, needing to be followed clinically with mris - explained to pt

## 2023-05-23 ENCOUNTER — APPOINTMENT (OUTPATIENT)
Dept: OTOLARYNGOLOGY | Facility: CLINIC | Age: 73
End: 2023-05-23
Payer: MEDICARE

## 2023-05-23 VITALS
WEIGHT: 142 LBS | OXYGEN SATURATION: 100 % | SYSTOLIC BLOOD PRESSURE: 146 MMHG | HEIGHT: 66 IN | DIASTOLIC BLOOD PRESSURE: 83 MMHG | HEART RATE: 68 BPM | BODY MASS INDEX: 22.82 KG/M2 | TEMPERATURE: 98 F

## 2023-05-23 PROCEDURE — 92537 CALORIC VSTBLR TEST W/REC: CPT

## 2023-05-23 PROCEDURE — 92540 BASIC VESTIBULAR EVALUATION: CPT

## 2023-05-23 PROCEDURE — 99213 OFFICE O/P EST LOW 20 MIN: CPT

## 2023-05-23 NOTE — HISTORY OF PRESENT ILLNESS
[de-identified] : 2 week followup visit for this 71 y/o M, patient of Dr. Haney's who was treated for r cpa/ brainstem meningioma s/p conventional radiation in 2019 with b hf snhl and vertigo. He wears b hearing aids. He is here to review VNG.

## 2023-05-23 NOTE — REASON FOR VISIT
[Subsequent Evaluation] : a subsequent evaluation for [FreeTextEntry2] : hearing loss, disequilibrium

## 2023-05-23 NOTE — ASSESSMENT
[FreeTextEntry1] : 1. disequilibrium\par -VNG nl, results reviewed with pt \par -10/7/22 MRI revealed stable r cpa meningioma- he gets serial imaging every 6 months\par -continue to followup with his neurologist\par could also relate to his h/o spinal stenosis - reassured inner ear and vestibular nerves are functioning normally\par 2. b hf snhl\par -pt is going for rpt  and ha adjustment with outside audiologist tomorrow\par -once ha is adjusted asked him to let us know if he is still having difficulty hearing to consider CAP test (the elements which can be addressed with this amt of snhl)\par RTC as needed\par \par -per pt request for urologist,  name and contact information provided for Dr. Vance

## 2023-10-04 ENCOUNTER — OUTPATIENT (OUTPATIENT)
Dept: OUTPATIENT SERVICES | Facility: HOSPITAL | Age: 73
LOS: 1 days | End: 2023-10-04
Payer: MEDICARE

## 2023-10-04 ENCOUNTER — APPOINTMENT (OUTPATIENT)
Dept: NEUROLOGY | Facility: CLINIC | Age: 73
End: 2023-10-04
Payer: MEDICARE

## 2023-10-04 ENCOUNTER — APPOINTMENT (OUTPATIENT)
Dept: MRI IMAGING | Facility: IMAGING CENTER | Age: 73
End: 2023-10-04
Payer: MEDICARE

## 2023-10-04 VITALS
DIASTOLIC BLOOD PRESSURE: 82 MMHG | OXYGEN SATURATION: 98 % | HEIGHT: 66 IN | HEART RATE: 68 BPM | SYSTOLIC BLOOD PRESSURE: 149 MMHG | RESPIRATION RATE: 16 BRPM | WEIGHT: 143 LBS | BODY MASS INDEX: 22.98 KG/M2

## 2023-10-04 DIAGNOSIS — D32.9 BENIGN NEOPLASM OF MENINGES, UNSPECIFIED: ICD-10-CM

## 2023-10-04 DIAGNOSIS — K08.409 PARTIAL LOSS OF TEETH, UNSPECIFIED CAUSE, UNSPECIFIED CLASS: Chronic | ICD-10-CM

## 2023-10-04 PROCEDURE — 99215 OFFICE O/P EST HI 40 MIN: CPT

## 2023-10-04 PROCEDURE — 70553 MRI BRAIN STEM W/O & W/DYE: CPT | Mod: 26,MH

## 2023-10-04 PROCEDURE — 70553 MRI BRAIN STEM W/O & W/DYE: CPT

## 2023-10-04 PROCEDURE — A9585: CPT

## 2023-10-04 RX ORDER — DOXAZOSIN 4 MG/1
4 TABLET ORAL
Refills: 0 | Status: DISCONTINUED | COMMUNITY
Start: 2019-08-06 | End: 2023-10-04

## 2023-10-04 RX ORDER — SILODOSIN 4 MG/1
4 CAPSULE ORAL DAILY
Qty: 30 | Refills: 0 | Status: ACTIVE | COMMUNITY
Start: 2023-10-04

## 2023-10-04 RX ORDER — FERROUS SULFATE TAB EC 324 MG (65 MG FE EQUIVALENT) 324 (65 FE) MG
324 (65 FE) TABLET DELAYED RESPONSE ORAL DAILY
Qty: 30 | Refills: 0 | Status: ACTIVE | COMMUNITY
Start: 2023-10-04

## 2023-10-18 ENCOUNTER — APPOINTMENT (OUTPATIENT)
Dept: OTOLARYNGOLOGY | Facility: CLINIC | Age: 73
End: 2023-10-18
Payer: MEDICARE

## 2023-10-18 VITALS
WEIGHT: 143 LBS | DIASTOLIC BLOOD PRESSURE: 86 MMHG | OXYGEN SATURATION: 98 % | HEART RATE: 82 BPM | HEIGHT: 66 IN | SYSTOLIC BLOOD PRESSURE: 133 MMHG | BODY MASS INDEX: 22.98 KG/M2 | TEMPERATURE: 98 F

## 2023-10-18 DIAGNOSIS — D32.0 BENIGN NEOPLASM OF CEREBRAL MENINGES: ICD-10-CM

## 2023-10-18 DIAGNOSIS — D32.9 BENIGN NEOPLASM OF MENINGES, UNSPECIFIED: ICD-10-CM

## 2023-10-18 DIAGNOSIS — R42 DIZZINESS AND GIDDINESS: ICD-10-CM

## 2023-10-18 DIAGNOSIS — H90.A21 SENSORINEURAL HEARING LOSS, UNILATERAL, RIGHT EAR, WITH RESTRICTED HEARING ON THE CONTRALATERAL SIDE: ICD-10-CM

## 2023-10-18 PROCEDURE — 92550 TYMPANOMETRY & REFLEX THRESH: CPT | Mod: 52

## 2023-10-18 PROCEDURE — 99213 OFFICE O/P EST LOW 20 MIN: CPT

## 2023-10-18 PROCEDURE — 92557 COMPREHENSIVE HEARING TEST: CPT

## 2024-01-24 NOTE — HISTORY OF PRESENT ILLNESS
[FreeTextEntry1] : Mr. Gustavo Arciniega is a 68 yo male who presented at this office for a neuro oncology follow up  In brief   PMH of 2 episodes what has been called transient global amnesia. The first occurred in January, 2015 - he was evaluated at [facility] and again in November, 2015. He was evaluated by a neurologist - Dr. Uma Deal:   MRI brain 1/31/15: Right CPA homogenously enhancing mass measuring 1.3 x 0.8 x 1.1 cm with enhancing dural tail.  MRA brain 1/31/15: without hemodynamically significant stenosis.  72 hour ambulatory EEG was normal  EEG 2/1/5 normal.  CPA angle mass was felt to be an incidental meningioma and was being monitored by Dr. Chung with interval scans. Between 2015 and 2019, there has been a gradual increased in size. He has developed bilateral hearing loss - right more than left (verified by VNG). He has also seen Dr. Shahid (ENT) and Dr. Kc who did not recommend surgery and referred the patient to Dr. Wheeler in August, 2019 for consideration for radiation.  He completed radiation therapy in October and received a total of 5040 cGy from 9/13 - 10/22/19 over 28 fractions.  On his follow up appointment with Dr. Wheeler on January 8, he complained of increased fatigue (which had begun during treatment and had reportedly increased since stopping). He also noted left sided headache, metallic taste with poor appetite, and reduced mental sharpness.  His most recent MRI brain, post-treatment on 12/20/19 - showed a stable right CPA mass with stable mass effect on the brainstem.  Since restarting steroids, his headache resolved immediately. He had been experiencing a metallic taste in his mouth which has been improving. He no longer has a sense of overwhelming fatigue - in fact he feels that the steroids are interrupting his sleep and that he is more tired but also more alert. He notes a tremor in his hands. He still feels that he is occasionally not as sharp mentally and that when he arises he has a sense of being tilted towards the left. He has had no falls. Recommended balance therapy  6/2020- MRI unchanged  11/23/2020- MRI stable. Patient saw an audiologist and hearing per exam remain the same.  5/11/2021, 3/30/2022, 10/6/2022 - MRI stable - recommended follow up in a year. On 5/23, he underwent VNG which was normal. Hearing test from 5/9/2023 showed right ear to have 64% word recognition and left ear 88%. He will be following up with Dr. Aden later this month to see if he is a candidate for cochlear implant.  10/4/2023- MRI stable. He still reports issues with gait imbalance but has not had any falls. He continues to have decreased hearing and he met with Dr Aden he may be a candidate for cochlear implant.

## 2024-01-25 ENCOUNTER — RESULT REVIEW (OUTPATIENT)
Age: 74
End: 2024-01-25

## 2024-01-25 ENCOUNTER — APPOINTMENT (OUTPATIENT)
Dept: NEUROLOGY | Facility: CLINIC | Age: 74
End: 2024-01-25
Payer: MEDICARE

## 2024-01-25 ENCOUNTER — NON-APPOINTMENT (OUTPATIENT)
Age: 74
End: 2024-01-25

## 2024-01-25 ENCOUNTER — OUTPATIENT (OUTPATIENT)
Dept: OUTPATIENT SERVICES | Facility: HOSPITAL | Age: 74
LOS: 1 days | Discharge: HOME | End: 2024-01-25

## 2024-01-25 ENCOUNTER — APPOINTMENT (OUTPATIENT)
Dept: HEMATOLOGY ONCOLOGY | Facility: CLINIC | Age: 74
End: 2024-01-25

## 2024-01-25 VITALS
RESPIRATION RATE: 16 BRPM | TEMPERATURE: 98.5 F | OXYGEN SATURATION: 98 % | HEIGHT: 66 IN | BODY MASS INDEX: 22.98 KG/M2 | WEIGHT: 143 LBS | HEART RATE: 61 BPM | DIASTOLIC BLOOD PRESSURE: 67 MMHG | SYSTOLIC BLOOD PRESSURE: 121 MMHG

## 2024-01-25 DIAGNOSIS — R25.1 TREMOR, UNSPECIFIED: ICD-10-CM

## 2024-01-25 DIAGNOSIS — Z01.812 ENCOUNTER FOR PREPROCEDURAL LABORATORY EXAMINATION: ICD-10-CM

## 2024-01-25 DIAGNOSIS — K08.409 PARTIAL LOSS OF TEETH, UNSPECIFIED CAUSE, UNSPECIFIED CLASS: Chronic | ICD-10-CM

## 2024-01-25 LAB
BASOPHILS # BLD AUTO: 0.07 K/UL — SIGNIFICANT CHANGE UP (ref 0–0.2)
BASOPHILS NFR BLD AUTO: 0.9 % — SIGNIFICANT CHANGE UP (ref 0–2)
EOSINOPHIL # BLD AUTO: 0.09 K/UL — SIGNIFICANT CHANGE UP (ref 0–0.5)
EOSINOPHIL NFR BLD AUTO: 1.2 % — SIGNIFICANT CHANGE UP (ref 0–6)
HCT VFR BLD CALC: 44.4 % — SIGNIFICANT CHANGE UP (ref 39–50)
HGB BLD-MCNC: 13.8 G/DL — SIGNIFICANT CHANGE UP (ref 13–17)
IMM GRANULOCYTES NFR BLD AUTO: 0.4 % — SIGNIFICANT CHANGE UP (ref 0–0.9)
LYMPHOCYTES # BLD AUTO: 0.98 K/UL — LOW (ref 1–3.3)
LYMPHOCYTES # BLD AUTO: 13.2 % — SIGNIFICANT CHANGE UP (ref 13–44)
MCHC RBC-ENTMCNC: 24.6 PG — LOW (ref 27–34)
MCHC RBC-ENTMCNC: 31.1 G/DL — LOW (ref 32–36)
MCV RBC AUTO: 79 FL — LOW (ref 80–100)
MONOCYTES # BLD AUTO: 0.48 K/UL — SIGNIFICANT CHANGE UP (ref 0–0.9)
MONOCYTES NFR BLD AUTO: 6.4 % — SIGNIFICANT CHANGE UP (ref 2–14)
NEUTROPHILS # BLD AUTO: 5.8 K/UL — SIGNIFICANT CHANGE UP (ref 1.8–7.4)
NEUTROPHILS NFR BLD AUTO: 77.9 % — HIGH (ref 43–77)
NRBC # BLD: 0 /100 WBCS — SIGNIFICANT CHANGE UP (ref 0–0)
PLATELET # BLD AUTO: 492 K/UL — HIGH (ref 150–400)
RBC # BLD: 5.62 M/UL — SIGNIFICANT CHANGE UP (ref 4.2–5.8)
RBC # FLD: 14.2 % — SIGNIFICANT CHANGE UP (ref 10.3–14.5)
WBC # BLD: 7.45 K/UL — SIGNIFICANT CHANGE UP (ref 3.8–10.5)
WBC # FLD AUTO: 7.45 K/UL — SIGNIFICANT CHANGE UP (ref 3.8–10.5)

## 2024-01-25 PROCEDURE — 99215 OFFICE O/P EST HI 40 MIN: CPT

## 2024-01-26 LAB
ALBUMIN SERPL ELPH-MCNC: 4 G/DL
ALP BLD-CCNC: 58 U/L
ALT SERPL-CCNC: 11 U/L
ANION GAP SERPL CALC-SCNC: 9 MMOL/L
AST SERPL-CCNC: 16 U/L
BILIRUB SERPL-MCNC: 0.3 MG/DL
BUN SERPL-MCNC: 25 MG/DL
CALCIUM SERPL-MCNC: 9.2 MG/DL
CHLORIDE SERPL-SCNC: 103 MMOL/L
CO2 SERPL-SCNC: 28 MMOL/L
CREAT SERPL-MCNC: 1.12 MG/DL
EGFR: 69 ML/MIN/1.73M2
FOLATE SERPL-MCNC: >20 NG/ML
GLUCOSE SERPL-MCNC: 83 MG/DL
POTASSIUM SERPL-SCNC: 5.1 MMOL/L
PROT SERPL-MCNC: 6.1 G/DL
SODIUM SERPL-SCNC: 139 MMOL/L
TSH SERPL-ACNC: 1.15 UIU/ML
VIT B12 SERPL-MCNC: 790 PG/ML

## 2024-01-29 ENCOUNTER — TRANSCRIPTION ENCOUNTER (OUTPATIENT)
Age: 74
End: 2024-01-29

## 2024-03-30 ENCOUNTER — EMERGENCY (EMERGENCY)
Facility: HOSPITAL | Age: 74
LOS: 1 days | Discharge: HOME | End: 2024-03-30
Attending: EMERGENCY MEDICINE | Admitting: EMERGENCY MEDICINE
Payer: MEDICARE

## 2024-03-30 VITALS
HEART RATE: 55 BPM | WEIGHT: 139.99 LBS | SYSTOLIC BLOOD PRESSURE: 156 MMHG | HEIGHT: 67 IN | TEMPERATURE: 98 F | RESPIRATION RATE: 16 BRPM | DIASTOLIC BLOOD PRESSURE: 87 MMHG | OXYGEN SATURATION: 98 %

## 2024-03-30 DIAGNOSIS — K08.409 PARTIAL LOSS OF TEETH, UNSPECIFIED CAUSE, UNSPECIFIED CLASS: Chronic | ICD-10-CM

## 2024-03-30 PROCEDURE — 93971 EXTREMITY STUDY: CPT

## 2024-03-30 PROCEDURE — 99284 EMERGENCY DEPT VISIT MOD MDM: CPT

## 2024-03-30 PROCEDURE — 99284 EMERGENCY DEPT VISIT MOD MDM: CPT | Mod: 25

## 2024-03-30 NOTE — ED PROVIDER NOTE - PHYSICAL EXAMINATION
Gen: Alert, NAD  Head/eyes: NC/AT, PERRL  ENT: airway patent  Neck: supple  Pulm/lung: Bilateral clear BS  CV/heart: RRR  GI/Abd: soft, NT/ND, +BS, no guarding/rebound tenderness  Musculoskeletal: no edema/erythema/cyanosis, +left lower leg swelling noted, +ttp left calf, no pitting edema  Skin: no rash  Neuro: AAOx3, grossly intact Gen: Alert, NAD  Head/eyes: NC/AT, PERRL  ENT: airway patent  Neck: supple  Pulm/lung: Bilateral clear BS  CV/heart: RRR, +2 pedal pulses b/l, +2 popliteal pulses b/l  GI/Abd: soft, NT/ND, +BS, no guarding/rebound tenderness  Musculoskeletal: no edema/erythema/cyanosis, +left lower leg swelling noted, +ttp left calf, no pitting edema  Skin: no rash  Neuro: AAOx3, grossly intact

## 2024-03-30 NOTE — ED PROVIDER NOTE - PATIENT PORTAL LINK FT
You can access the FollowMyHealth Patient Portal offered by [facility] by registering at the following website: [email or web address]. By joining [employer]’s FollowMyHealth portal, you will also be able to view your health information using other applications (apps) compatible with our system.

## 2024-03-30 NOTE — ED PROVIDER NOTE - OBJECTIVE STATEMENT
73-year-old male history of polycythemia vera on daily baby aspirin complaining of left lower leg swelling calf pain for the past 4 days.  Denies any chest pain shortness of breath fever chills.  No history of DVT or PE.

## 2024-03-30 NOTE — ED PROVIDER NOTE - PROGRESS NOTE DETAILS
Ultrasound results explained to patient patient will follow-up with his primary care doctor.Patient does now note that he was recently started on valsartan.

## 2024-03-30 NOTE — ED ADULT NURSE NOTE - ED STAT RN HANDOFF DETAILS
Pt d/c in stable condition. D/c instructions reviewed, verbalized understanding. Pt ambulatory, left ED.

## 2024-03-30 NOTE — ED ADULT NURSE NOTE - NSFALLUNIVINTERV_ED_ALL_ED
Bed/Stretcher in lowest position, wheels locked, appropriate side rails in place/Call bell, personal items and telephone in reach/Instruct patient to call for assistance before getting out of bed/chair/stretcher/Non-slip footwear applied when patient is off stretcher/[place] to call system/Physically safe environment - no spills, clutter or unnecessary equipment/Purposeful proactive rounding/Room/bathroom lighting operational, light cord in reach

## 2024-03-30 NOTE — ED ADULT NURSE NOTE - OBJECTIVE STATEMENT
Pt arrived to ED c/o L calf pain started about 3-4 days ago. Pt reports knee injury while exercising about a week ago, knee pain has subsided but pain has moved to calf. Sig hx of polycythemia vera and takes 81mg ASA daily. L knee no deformity noted. L calf visibly swollen compared to R w/ some ecchymosis and slight warmth. Denies CP/SOB/N/V/D/f/c. Awaiting orders.

## 2024-03-31 PROCEDURE — 93971 EXTREMITY STUDY: CPT | Mod: 26,LT

## 2024-06-05 ENCOUNTER — APPOINTMENT (OUTPATIENT)
Dept: OTOLARYNGOLOGY | Facility: CLINIC | Age: 74
End: 2024-06-05
Payer: MEDICARE

## 2024-06-05 VITALS
SYSTOLIC BLOOD PRESSURE: 156 MMHG | OXYGEN SATURATION: 98 % | HEART RATE: 59 BPM | DIASTOLIC BLOOD PRESSURE: 80 MMHG | TEMPERATURE: 97.9 F | WEIGHT: 143 LBS | HEIGHT: 66 IN | BODY MASS INDEX: 22.98 KG/M2

## 2024-06-05 DIAGNOSIS — H61.23 IMPACTED CERUMEN, BILATERAL: ICD-10-CM

## 2024-06-05 DIAGNOSIS — H90.3 SENSORINEURAL HEARING LOSS, BILATERAL: ICD-10-CM

## 2024-06-05 DIAGNOSIS — D32.0 BENIGN NEOPLASM OF CEREBRAL MENINGES: ICD-10-CM

## 2024-06-05 PROCEDURE — 92567 TYMPANOMETRY: CPT

## 2024-06-05 PROCEDURE — 92557 COMPREHENSIVE HEARING TEST: CPT

## 2024-06-05 PROCEDURE — 99213 OFFICE O/P EST LOW 20 MIN: CPT | Mod: 25

## 2024-06-05 PROCEDURE — G0268 REMOVAL OF IMPACTED WAX MD: CPT

## 2024-06-05 NOTE — PROCEDURE
[Cerumen Impaction] : Cerumen Impaction [Same] : same as the Pre Op Dx. [] : Removal of Cerumen [FreeTextEntry5] : b copious cerumen removed atraumatically with curette, b tms nl

## 2024-06-05 NOTE — HISTORY OF PRESENT ILLNESS
[de-identified] : 7 month follow up visit for this 72 y/o M with h/o disequilibrium and b hf snhl.  He is a patient of Dr. Haney's who was treated for r cpa / brainstem meningioma s/p conventional radiation in 2019. He feels his hearing is worsening and is here to recheck his hearing. He wears b hearing aids.

## 2024-06-05 NOTE — ASSESSMENT
[FreeTextEntry1] : 1. r cpa lesion -MRI from 10/23 revealed stable r 2.4 cm cpa mass -continue to followup with neuro -he has rpt MRI in 10/24 2. b snhl - showed b snhl- overall stable compared with 10/23, b nl tympanograms- results reviewed with pt -continue with ha- recommend he have them optimized with his audiologist 3. b cerumen impaction -cerumen removed -ears felt better RTC in 6 months; call sooner for any issues

## 2024-06-05 NOTE — PHYSICAL EXAM
[de-identified] : b copious cerumen removed atraumatically with curette, b tms nl [Normal] : no nystagmus [de-identified] : gait steady

## 2024-06-05 NOTE — DATA REVIEWED
[de-identified] :  showed b snhl- overall stable compared with 10/23, b nl tympanograms- results reviewed with pt [de-identified] : prior mri showed no change

## 2024-10-09 ENCOUNTER — APPOINTMENT (OUTPATIENT)
Dept: MRI IMAGING | Facility: IMAGING CENTER | Age: 74
End: 2024-10-09

## 2024-10-09 ENCOUNTER — OUTPATIENT (OUTPATIENT)
Dept: OUTPATIENT SERVICES | Facility: HOSPITAL | Age: 74
LOS: 1 days | End: 2024-10-09
Payer: MEDICARE

## 2024-10-09 ENCOUNTER — APPOINTMENT (OUTPATIENT)
Dept: NEUROLOGY | Facility: CLINIC | Age: 74
End: 2024-10-09
Payer: MEDICARE

## 2024-10-09 VITALS
SYSTOLIC BLOOD PRESSURE: 153 MMHG | OXYGEN SATURATION: 99 % | WEIGHT: 145 LBS | HEIGHT: 66 IN | DIASTOLIC BLOOD PRESSURE: 83 MMHG | BODY MASS INDEX: 23.3 KG/M2 | HEART RATE: 56 BPM | RESPIRATION RATE: 16 BRPM

## 2024-10-09 DIAGNOSIS — K08.409 PARTIAL LOSS OF TEETH, UNSPECIFIED CAUSE, UNSPECIFIED CLASS: Chronic | ICD-10-CM

## 2024-10-09 DIAGNOSIS — D32.9 BENIGN NEOPLASM OF MENINGES, UNSPECIFIED: ICD-10-CM

## 2024-10-09 PROCEDURE — 99215 OFFICE O/P EST HI 40 MIN: CPT

## 2024-10-09 PROCEDURE — 70553 MRI BRAIN STEM W/O & W/DYE: CPT | Mod: 26,MH

## 2024-10-15 ENCOUNTER — APPOINTMENT (OUTPATIENT)
Facility: CLINIC | Age: 74
End: 2024-10-15
Payer: MEDICARE

## 2024-10-15 VITALS
OXYGEN SATURATION: 98 % | TEMPERATURE: 98.9 F | HEART RATE: 69 BPM | WEIGHT: 142 LBS | RESPIRATION RATE: 17 BRPM | BODY MASS INDEX: 22.82 KG/M2 | DIASTOLIC BLOOD PRESSURE: 83 MMHG | HEIGHT: 66 IN | SYSTOLIC BLOOD PRESSURE: 123 MMHG

## 2024-10-15 DIAGNOSIS — D75.1 SECONDARY POLYCYTHEMIA: ICD-10-CM

## 2024-10-15 DIAGNOSIS — D32.0 BENIGN NEOPLASM OF CEREBRAL MENINGES: ICD-10-CM

## 2024-10-15 DIAGNOSIS — I10 ESSENTIAL (PRIMARY) HYPERTENSION: ICD-10-CM

## 2024-10-15 DIAGNOSIS — M81.0 AGE-RELATED OSTEOPOROSIS W/OUT CURRENT PATHOLOGICAL FRACTURE: ICD-10-CM

## 2024-10-15 PROCEDURE — 99203 OFFICE O/P NEW LOW 30 MIN: CPT

## 2024-10-15 PROCEDURE — 99213 OFFICE O/P EST LOW 20 MIN: CPT

## 2024-11-01 ENCOUNTER — NON-APPOINTMENT (OUTPATIENT)
Age: 74
End: 2024-11-01

## 2024-11-01 DIAGNOSIS — Z86.69 PERSONAL HISTORY OF OTHER DISEASES OF THE NERVOUS SYSTEM AND SENSE ORGANS: ICD-10-CM

## 2024-11-01 DIAGNOSIS — E78.5 HYPERLIPIDEMIA, UNSPECIFIED: ICD-10-CM

## 2024-11-01 DIAGNOSIS — G47.33 OBSTRUCTIVE SLEEP APNEA (ADULT) (PEDIATRIC): ICD-10-CM

## 2024-11-20 PROCEDURE — A9585: CPT

## 2024-11-20 PROCEDURE — 70553 MRI BRAIN STEM W/O & W/DYE: CPT

## 2024-12-03 DIAGNOSIS — R41.3 OTHER AMNESIA: ICD-10-CM

## 2025-01-13 ENCOUNTER — APPOINTMENT (OUTPATIENT)
Dept: NEUROLOGY | Facility: CLINIC | Age: 75
End: 2025-01-13

## 2025-01-13 PROCEDURE — 95816 EEG AWAKE AND DROWSY: CPT

## 2025-01-14 PROCEDURE — 95719 EEG PHYS/QHP EA INCR W/O VID: CPT

## 2025-01-14 PROCEDURE — 95708 EEG WO VID EA 12-26HR UNMNTR: CPT

## 2025-01-15 PROCEDURE — 95700 EEG CONT REC W/VID EEG TECH: CPT

## 2025-02-12 ENCOUNTER — OFFICE (OUTPATIENT)
Dept: URBAN - METROPOLITAN AREA CLINIC 109 | Facility: CLINIC | Age: 75
Setting detail: OPHTHALMOLOGY
End: 2025-02-12
Payer: MEDICARE

## 2025-02-12 VITALS — HEIGHT: 60 IN

## 2025-02-12 DIAGNOSIS — H02.832: ICD-10-CM

## 2025-02-12 DIAGNOSIS — H02.834: ICD-10-CM

## 2025-02-12 DIAGNOSIS — H02.835: ICD-10-CM

## 2025-02-12 DIAGNOSIS — H02.831: ICD-10-CM

## 2025-02-12 DIAGNOSIS — H11.33: ICD-10-CM

## 2025-02-12 PROCEDURE — 99202 OFFICE O/P NEW SF 15 MIN: CPT | Performed by: OPHTHALMOLOGY

## 2025-02-12 ASSESSMENT — TONOMETRY
OD_IOP_MMHG: 13
OS_IOP_MMHG: 13
OD_IOP_MMHG: 13
OS_IOP_MMHG: 13

## 2025-02-12 ASSESSMENT — REFRACTION_MANIFEST
OD_AXIS: 80
OD_SPHERE: PLANO
OD_CYLINDER: -0.75
OS_SPHERE: +0.25
OD_VA1: 20/25+
OS_VA1: 20/20
OD_AXIS: 60
OD_SPHERE: -2.00
OS_VA1: 20/40-2
OD_VA1: 20/20-
OD_CYLINDER: -2.00
OS_SPHERE: -5.25
OS_CYLINDER: -1.00
OS_AXIS: 60

## 2025-02-12 ASSESSMENT — REFRACTION_CURRENTRX
OD_SPHERE: +0.25
OS_SPHERE: +0.50
OS_ADD: +2.25
OD_CYLINDER: -0.50
OD_ADD: +2.25
OS_CYLINDER: -0.50
OD_AXIS: 65
OS_OVR_VA: 20/
OS_AXIS: 25
OD_OVR_VA: 20/

## 2025-02-12 ASSESSMENT — LID POSITION - DERMATOCHALASIS
OD_DERMATOCHALASIS: RLL RUL 1+ 2+
OS_DERMATOCHALASIS: LLL LUL 1+ 2+

## 2025-02-12 ASSESSMENT — REFRACTION_AUTOREFRACTION
OS_CYLINDER: -1.00
OD_SPHERE: 0.00
OS_AXIS: 60
OS_SPHERE: +0.25
OD_CYLINDER: -0.75
OD_AXIS: 60

## 2025-02-12 ASSESSMENT — KERATOMETRY
OD_K2POWER_DIOPTERS: 46.50
OS_AXISANGLE_DEGREES: 90
OD_AXISANGLE_DEGREES: 114
OS_K2POWER_DIOPTERS: 46.25
OS_K1POWER_DIOPTERS: 45.37
OD_K1POWER_DIOPTERS: 44.75

## 2025-02-12 ASSESSMENT — VISUAL ACUITY
OS_BCVA: 20/20
OD_BCVA: 20/50-1

## 2025-02-12 ASSESSMENT — CONFRONTATIONAL VISUAL FIELD TEST (CVF)
OD_FINDINGS: FULL
OS_FINDINGS: FULL

## 2025-03-11 ENCOUNTER — APPOINTMENT (OUTPATIENT)
Facility: CLINIC | Age: 75
End: 2025-03-11
Payer: MEDICARE

## 2025-03-11 VITALS
TEMPERATURE: 97.7 F | HEIGHT: 66 IN | SYSTOLIC BLOOD PRESSURE: 139 MMHG | HEART RATE: 73 BPM | DIASTOLIC BLOOD PRESSURE: 82 MMHG | OXYGEN SATURATION: 96 % | WEIGHT: 147 LBS | BODY MASS INDEX: 23.63 KG/M2 | RESPIRATION RATE: 17 BRPM

## 2025-03-11 DIAGNOSIS — D32.0 BENIGN NEOPLASM OF CEREBRAL MENINGES: ICD-10-CM

## 2025-03-11 DIAGNOSIS — Z97.4 PRESENCE OF EXTERNAL HEARING-AID: ICD-10-CM

## 2025-03-11 DIAGNOSIS — E55.9 VITAMIN D DEFICIENCY, UNSPECIFIED: ICD-10-CM

## 2025-03-11 DIAGNOSIS — Z86.03 PERSONAL HISTORY OF NEOPLASM OF UNCERTAIN BEHAVIOR: ICD-10-CM

## 2025-03-11 DIAGNOSIS — R41.3 OTHER AMNESIA: ICD-10-CM

## 2025-03-11 DIAGNOSIS — Z82.49 FAMILY HISTORY OF ISCHEMIC HEART DISEASE AND OTHER DISEASES OF THE CIRCULATORY SYSTEM: ICD-10-CM

## 2025-03-11 DIAGNOSIS — Z86.69 PERSONAL HISTORY OF OTHER DISEASES OF THE NERVOUS SYSTEM AND SENSE ORGANS: ICD-10-CM

## 2025-03-11 DIAGNOSIS — H90.3 SENSORINEURAL HEARING LOSS, BILATERAL: ICD-10-CM

## 2025-03-11 DIAGNOSIS — D32.9 BENIGN NEOPLASM OF MENINGES, UNSPECIFIED: ICD-10-CM

## 2025-03-11 DIAGNOSIS — Z87.438 PERSONAL HISTORY OF OTHER DISEASES OF MALE GENITAL ORGANS: ICD-10-CM

## 2025-03-11 DIAGNOSIS — E78.5 HYPERLIPIDEMIA, UNSPECIFIED: ICD-10-CM

## 2025-03-11 DIAGNOSIS — H91.93 UNSPECIFIED HEARING LOSS, BILATERAL: ICD-10-CM

## 2025-03-11 DIAGNOSIS — Z81.8 FAMILY HISTORY OF OTHER MENTAL AND BEHAVIORAL DISORDERS: ICD-10-CM

## 2025-03-11 DIAGNOSIS — H90.A21 SENSORINEURAL HEARING LOSS, UNILATERAL, RIGHT EAR, WITH RESTRICTED HEARING ON THE CONTRALATERAL SIDE: ICD-10-CM

## 2025-03-11 DIAGNOSIS — D75.1 SECONDARY POLYCYTHEMIA: ICD-10-CM

## 2025-03-11 DIAGNOSIS — I10 ESSENTIAL (PRIMARY) HYPERTENSION: ICD-10-CM

## 2025-03-11 DIAGNOSIS — Z78.9 OTHER SPECIFIED HEALTH STATUS: ICD-10-CM

## 2025-03-11 DIAGNOSIS — Z80.1 FAMILY HISTORY OF MALIGNANT NEOPLASM OF TRACHEA, BRONCHUS AND LUNG: ICD-10-CM

## 2025-03-11 DIAGNOSIS — G45.4 TRANSIENT GLOBAL AMNESIA: ICD-10-CM

## 2025-03-11 DIAGNOSIS — G47.33 OBSTRUCTIVE SLEEP APNEA (ADULT) (PEDIATRIC): ICD-10-CM

## 2025-03-11 DIAGNOSIS — Z87.891 PERSONAL HISTORY OF NICOTINE DEPENDENCE: ICD-10-CM

## 2025-03-11 DIAGNOSIS — I49.9 CARDIAC ARRHYTHMIA, UNSPECIFIED: ICD-10-CM

## 2025-03-11 DIAGNOSIS — M81.0 AGE-RELATED OSTEOPOROSIS W/OUT CURRENT PATHOLOGICAL FRACTURE: ICD-10-CM

## 2025-03-11 PROCEDURE — G0438: CPT

## 2025-03-11 PROCEDURE — 93000 ELECTROCARDIOGRAM COMPLETE: CPT

## 2025-03-11 PROCEDURE — 99397 PER PM REEVAL EST PAT 65+ YR: CPT | Mod: GY

## 2025-03-11 PROCEDURE — G0439: CPT | Mod: GY

## 2025-05-13 DIAGNOSIS — K86.89 OTHER SPECIFIED DISEASES OF PANCREAS: ICD-10-CM

## 2025-05-21 ENCOUNTER — APPOINTMENT (OUTPATIENT)
Dept: MRI IMAGING | Facility: CLINIC | Age: 75
End: 2025-05-21

## 2025-05-21 ENCOUNTER — RESULT REVIEW (OUTPATIENT)
Age: 75
End: 2025-05-21

## 2025-05-21 ENCOUNTER — OUTPATIENT (OUTPATIENT)
Dept: OUTPATIENT SERVICES | Facility: HOSPITAL | Age: 75
LOS: 1 days | End: 2025-05-21
Payer: MEDICARE

## 2025-05-21 DIAGNOSIS — K08.409 PARTIAL LOSS OF TEETH, UNSPECIFIED CAUSE, UNSPECIFIED CLASS: Chronic | ICD-10-CM

## 2025-05-21 DIAGNOSIS — K86.89 OTHER SPECIFIED DISEASES OF PANCREAS: ICD-10-CM

## 2025-05-21 PROCEDURE — 74183 MRI ABD W/O CNTR FLWD CNTR: CPT | Mod: 26

## 2025-05-21 PROCEDURE — 74183 MRI ABD W/O CNTR FLWD CNTR: CPT

## 2025-05-21 PROCEDURE — A9585: CPT

## 2025-07-28 ENCOUNTER — RX RENEWAL (OUTPATIENT)
Age: 75
End: 2025-07-28

## 2025-07-28 RX ORDER — VALSARTAN 40 MG/1
40 TABLET, COATED ORAL
Qty: 90 | Refills: 5 | Status: ACTIVE | COMMUNITY
Start: 2025-07-28 | End: 1900-01-01